# Patient Record
Sex: FEMALE | Race: WHITE | NOT HISPANIC OR LATINO | Employment: OTHER | URBAN - METROPOLITAN AREA
[De-identification: names, ages, dates, MRNs, and addresses within clinical notes are randomized per-mention and may not be internally consistent; named-entity substitution may affect disease eponyms.]

---

## 2022-05-12 ENCOUNTER — APPOINTMENT (OUTPATIENT)
Dept: CT IMAGING | Facility: HOSPITAL | Age: 82
End: 2022-05-12
Attending: EMERGENCY MEDICINE
Payer: COMMERCIAL

## 2022-05-12 ENCOUNTER — HOSPITAL ENCOUNTER (EMERGENCY)
Facility: HOSPITAL | Age: 82
Discharge: HOME | End: 2022-05-12
Attending: EMERGENCY MEDICINE | Admitting: EMERGENCY MEDICINE
Payer: COMMERCIAL

## 2022-05-12 VITALS
DIASTOLIC BLOOD PRESSURE: 81 MMHG | RESPIRATION RATE: 15 BRPM | HEART RATE: 78 BPM | SYSTOLIC BLOOD PRESSURE: 185 MMHG | WEIGHT: 187.39 LBS | OXYGEN SATURATION: 99 % | TEMPERATURE: 98.5 F

## 2022-05-12 DIAGNOSIS — S09.90XA CLOSED HEAD INJURY, INITIAL ENCOUNTER: ICD-10-CM

## 2022-05-12 PROBLEM — D12.6 BENIGN NEOPLASM OF COLON: Status: ACTIVE | Noted: 2022-05-12

## 2022-05-12 PROBLEM — M85.859 OSTEOPENIA OF THIGH: Status: ACTIVE | Noted: 2017-12-05

## 2022-05-12 PROBLEM — I73.9 PVD (PERIPHERAL VASCULAR DISEASE) (H): Status: ACTIVE | Noted: 2021-01-13

## 2022-05-12 PROBLEM — S85.001A INJURY OF RIGHT POPLITEAL ARTERY: Status: ACTIVE | Noted: 2020-10-21

## 2022-05-12 PROBLEM — J98.4 DISEASE OF LUNG: Status: ACTIVE | Noted: 2022-05-12

## 2022-05-12 PROBLEM — M25.819 OTHER AFFECTIONS OF SHOULDER REGION, NOT ELSEWHERE CLASSIFIED: Status: ACTIVE | Noted: 2022-05-12

## 2022-05-12 PROBLEM — K57.30 DIVERTICULOSIS OF LARGE INTESTINE: Status: ACTIVE | Noted: 2022-05-12

## 2022-05-12 PROBLEM — J44.9 CHRONIC OBSTRUCTIVE PULMONARY DISEASE (H): Status: ACTIVE | Noted: 2019-01-09

## 2022-05-12 PROBLEM — J45.909 ASTHMA: Status: ACTIVE | Noted: 2022-05-12

## 2022-05-12 PROBLEM — K52.832 LYMPHOCYTIC COLITIS: Status: ACTIVE | Noted: 2022-01-14

## 2022-05-12 PROBLEM — M72.2 PLANTAR FASCIAL FIBROMATOSIS: Status: ACTIVE | Noted: 2022-05-12

## 2022-05-12 PROBLEM — C44.40 UNSPECIFIED MALIGNANT NEOPLASM OF SKIN OF SCALP AND NECK: Status: ACTIVE | Noted: 2019-01-10

## 2022-05-12 PROCEDURE — 70450 CT HEAD/BRAIN W/O DYE: CPT

## 2022-05-12 PROCEDURE — 99284 EMERGENCY DEPT VISIT MOD MDM: CPT | Mod: 25

## 2022-05-12 ASSESSMENT — ENCOUNTER SYMPTOMS
HEADACHES: 1
ARTHRALGIAS: 1

## 2022-05-13 NOTE — ED TRIAGE NOTES
She feel two days ago striking her head on a book case. She tripped on a rug. She denies LOC. She came in today because because she has a headache on the left side. She is on blood thinners.

## 2022-05-13 NOTE — ED PROVIDER NOTES
EMERGENCY DEPARTMENT ENCOUNTER      NAME: Aster Pierson  AGE: 81 year old female  YOB: 1940  MRN: [id number]  EVALUATION DATE & TIME: 5/12/2022  8:42 PM    PCP: No primary care provider on file.    ED PROVIDER: Quinton Mcclure M.D.      Chief Complaint   Patient presents with     Fall         IMPRESSION  1. Closed head injury, initial encounter        PLAN  - close PCP follow up  - discharge to home    ED COURSE & MEDICAL DECISION MAKING    ED Course as of 05/12/22 2237   Thu May 12, 2022   2058 81yoF with history of COPD, PVD (takes Plavix but no other blood thinners), HTN presenting for evaluation of headache after head injury. Reports she tripped on a rug and fell at home 2 nights ago; hit left side of head without LOC. Ongoing headache since then; came to the ED concerned about head bleed (states her  had SDH in the past). Denies any vision changes, numbness, tingling, focal weakness, neck pain. No other pains after fall.    BP 190s/80s on presentation with otherwise normal vitals. Exam with 0.5cm diameter mildly-tender hematoma to left parietal scalp with no c-spine tenderness with painless active ROM intact, other trauma exam with nontender ecchymosis to left forearm & right knee with otherwise atraumatic exam. Otherwise has completely normal neuro exam. Will obtain CT head to rule out ICH; low clinical concern for this though. Patient comfortable with this plan; no further questions at this time.     CT head unremarkable with no ICH or other acute abnormality. Patient feeling well on recheck with SBP 150s; continues to have normal neuro exam. Ok for outpatient management. Patient able to tolerate PO and walk without difficulty. Patient comfortable with discharge at this time. Return precautions and need for PCP follow up discussed and understood. No further questions at the time of discharge.    --------------------------------------------------------------------------------    --------------------------------------------------------------------------------     8:46 PM I met with the patient for the initial interview and physical examination. Discussed plan for treatment and workup in the ED.  9:54 PM We discussed the plan for discharge and the patient is agreeable. Reviewed supportive cares, symptomatic treatment, outpatient follow up, and reasons to return to the Emergency Department. All questions and concerns were addressed. Patient to be discharged by ED RN.      This patient involved a high degree of complexity in medical decision making, as significant risks were present and assessed.    Broad differential considered for this patient presenting, including but not limited to:  ICH, skull fracture, scalp contusion, other traumatic injury    I wore the following PPE during this patient encounter:  N95 mask, face shield w/ eye protection, gloves, gown    MEDICATIONS GIVEN IN THE EMERGENCY DEPARTMENT  Medications - No data to display    NEW PRESCRIPTIONS STARTED AT TODAY'S ER VISIT  There are no discharge medications for this patient.          =================================================================      HPI  Patient information was obtained from: patient     Use of : N/A         Aster Pierson is a 81 year old female with a pertinent history of hypertension, COPD, asthma, and PVD who presents to this ED via walk in for evaluation of fall.     On Tuesday 5/10/22, the patient reports having tripping over a rug. She notes that she hit the left side of her head on a bookcase and then proceeded to fall to the ground on her right knee. The patient is now having associated left sided headache and right knee pain. The patient denies loss of consciousness or any other complaints at this time.    The patient is taking the blood thinner Plavix.     REVIEW OF SYSTEMS   Review of Systems   Constitutional:        Positive for fall   Musculoskeletal: Positive for arthralgias  (right knee pain).   Neurological: Positive for headaches (left sided).        Negative for loss of consciousness    All other systems reviewed and are negative.    All other systems reviewed and are negative except as noted above in HPI.          --------------- MEDICAL HISTORY ---------------  PAST MEDICAL HISTORY:  No past medical history on file.  Patient Active Problem List   Diagnosis     Unspecified malignant neoplasm of skin of scalp and neck     Plantar fascial fibromatosis     Other affections of shoulder region, not elsewhere classified     Osteopenia of thigh     VALERIE (obstructive sleep apnea)     Malignant melanoma (H)     Lymphocytic colitis     Interstitial cystitis     Injury of right popliteal artery     PVD (peripheral vascular disease) (H)     Diverticulosis of large intestine     Disease of lung     Chronic obstructive pulmonary disease (H)     Asthma     Benign neoplasm of colon     Abnormal finding on cardiovascular stress test       PAST SURGICAL HISTORY:  Reviewed. No pertinent past surgical history.    CURRENT MEDICATIONS:    No current facility-administered medications for this encounter.  No current outpatient medications on file.    ALLERGIES:  No Known Allergies    FAMILY HISTORY:  Reviewed. No pertinent past family history.    SOCIAL HISTORY:   Social History     Socioeconomic History     Marital status:          --------------- PHYSICAL EXAM ---------------  Nursing notes and vitals reviewed by me.  VITALS:  Vitals:    05/12/22 1955 05/12/22 2047   BP: (!) 190/89 (!) 185/81   Pulse: 79 78   Resp: 12 15   Temp: 98.5  F (36.9  C)    TempSrc: Temporal    SpO2: 95% 99%   Weight: 85 kg (187 lb 6.3 oz)        PHYSICAL EXAM:    General:  alert, interactive, no distress  Eyes:  conjunctivae clear, conjugate gaze  HENT:  nose with no rhinorrhea, oropharynx clear. 0.5cm diameter left parietal scalp hematoma. Overlying skin intact.   Neck:  no meningismus. No c-spine tenderness. Painless  ROM  Cardiovascular:  HR 100s during exam, regular rhythm, no murmurs, brisk cap refill  Chest:  no chest wall tenderness  Pulmonary:  no stridor, normal phonation, normal work of breathing, clear lungs bilaterally  Abdomen:  soft, nondistended, nontender  :  no CVA tenderness  Back:  no midline spinal tenderness  Musculoskeletal:  no pretibial edema, no calf tenderness. Gross ROM intact to joints of extremities with no obvious deformities.  Skin:  warm, dry, no rash  Neuro:  awake, alert, answers questions appropriately, follows commands, moves all limbs, CN 2-12 intact, negative pronator drift, 5/5 strength to all extremities with sensation to light touch intact  Psych:  calm, normal affect      --------------- RESULTS ---------------  RADIOLOGY:  Reviewed by me. Please see official radiology report.  Recent Results (from the past 24 hour(s))   CT Head w/o Contrast    Narrative    EXAM: CT HEAD W/O CONTRAST  LOCATION: [facility]  DATE/TIME: 5/12/2022 9:10 PM    INDICATION: Fall, head injury  COMPARISON: None.  TECHNIQUE: Routine CT Head without IV contrast. Multiplanar reformats. Dose reduction techniques were used.    FINDINGS:  INTRACRANIAL CONTENTS: No intracranial hemorrhage, extraaxial collection, or mass effect.  No CT evidence of acute infarct. Mild presumed chronic small vessel ischemic changes. Mild generalized volume loss. No hydrocephalus.     VISUALIZED ORBITS/SINUSES/MASTOIDS: Prior bilateral cataract surgery. Visualized portions of the orbits are otherwise unremarkable. No paranasal sinus mucosal disease. No middle ear or mastoid effusion.    BONES/SOFT TISSUES: No acute abnormality.      Impression    IMPRESSION:  1.  No acute intracranial abnormality.             IMichelle, am serving as a scribe to document services personally performed by Dr. Quinton Mcclure based on my observation and the provider's statements to me. Quinton DODD MD attest that Michelle Veragra  is acting in a scribe capacity, has observed my performance of the services and has documented them in accordance with my direction.      Quinton Mcclure MD  05/12/22  Emergency Medicine  [facility] EMERGENCY DEPARTMENT  [address] [place] [id number]  [phone]  Dept: [phone]     Quinton Mcclure MD  05/12/22 7543

## 2022-05-13 NOTE — DISCHARGE INSTRUCTIONS
As needed for pain control at home, take:  - over-the-counter ibuprofen 800mg by mouth every 8 hours (max dose 2400mg in 24 hours)  - over-the-counter acetaminophen 1g by mouth every 6 hours (max dose 4g in 24 hours)  - ice pack to painful area up to 20 minutes every 1 hour    Follow up with your Primary Care provider in 2 days for a recheck.    Return to the Emergency Department for any persistent vomiting, inability to walk, or any other concerns.

## 2024-08-30 ENCOUNTER — TRANSFERRED RECORDS (OUTPATIENT)
Dept: HEALTH INFORMATION MANAGEMENT | Facility: CLINIC | Age: 84
End: 2024-08-30
Payer: COMMERCIAL

## 2024-08-30 ENCOUNTER — TELEPHONE (OUTPATIENT)
Dept: VASCULAR SURGERY | Facility: CLINIC | Age: 84
End: 2024-08-30
Payer: COMMERCIAL

## 2024-08-30 NOTE — TELEPHONE ENCOUNTER
Faxed records from [facility] received at 6:47am on 8/30/24 to the vascular fax.  No referral or any indication of why these were sent to us.  Forwarded fax to HIM.

## 2025-04-05 NOTE — H&P (VIEW-ONLY)
[facility]      Preoperative Consultation   Aster Pierson   : 1940   Gender: female    Date of Encounter: 2025    Nursing Notes:   Kizzy Marin CNA  2025  1:42 PM  Signed  Chief Complaint   Patient presents with     Preoperative Exam     SURGERY :    KNEE REPLACEMENT   SURGEON : DR CAMILO ADAN   FAX :[phone]          Additional visit information (chief complaint/health maintenance) shared by patient: none  Health Maintenance Due   Topic Date Due     COVID-19 vaccine series ( season) 2024     Health maintenance reviewed with patient Yes      Patient presents for an in-person office visit: alone  Communication Method: Patient is active on [employer] and has been instructed that results/communications will be made via [employer]  If a phone call is needed, the preferred number is: Mobile   Home Phone [phone]   Work Phone [phone]   Mobile [phone]     May we leave a detailed message at this number? Yes    Aster Pierson is a 84 y.o. female (1940) who presents for preop consultation at the request of DR. CAMILO COELLO     Date of Surgery: 2025  Surgical Specialty: Orthopedic  Cmailo Coello MD - [facility]/Surgical Facility: [facility]  Fax number: [phone]  Surgery type: inpatient  Primary Physician: Yady Zambrano CNA ....................  2025   1:11 PM          Lakisha Arnett LPN  2025  2:25 PM  Signed  Student charting has been reviewed and approved for this patient s record.  Lakisha Arnett LPN 2025 2:25 PM         History of Present Illness   Patient is here to do her preop physical for a left knee replacement.  Had the right one done 5 yrs ago.  No other issues.           Review of Systems   A comprehensive review of systems was negative except for items noted in HPI.    Patient Active Problem List   Diagnosis Code      ASTHMA  J45.909     Basal cell carcinoma of right neck C44.40     HTN (hypertension) I10     Malignant melanoma (HC) C43.9     Interstitial cystitis N30.10     VALERIE (obstructive sleep apnea) G47.33     Mild Osteopenia on DEXA Dec 2017 M85.859     Chronic obstructive pulmonary disease (HC) J44.9     PVD (peripheral vascular disease) I73.9     Lymphocytic colitis K52.832     Benign essential tremor G25.0     Depression F32.A     Hyperlipidemia E78.5     Traumatic ulcer of left lower leg with fat layer exposed (HC) L97.922     Injury of right popliteal artery S85.001A     Benign neoplasm of colon D12.6     Bilateral lower extremity edema R60.0     BMI 29.0-29.9,adult Z68.29     Current Outpatient Medications   Medication Sig     albuterol HFA (PRO-AIR; VENTOLIN; PROVENTIL) 90 mcg/actuation inhaler INHALE 1-2 PUFFS BY MOUTH EVERY 4 HOURS IF NEEDED.     artificial tears, peg 400-propylene glycol, (Systane Ultra) 0.4-0.3 % drop ophthalmic Place 1 Drop into both eyes two times daily.     atorvastatin 10 mg tablet TAKE 1 TABLET BY MOUTH EVERYDAY AT BEDTIME     chlorthalidone (HYGROTON) 50 mg tablet Take 1 Tablet (50 mg) by mouth once daily.     cholecalciferol, Vitamin D3, 2,000 unit tablet Take 1 Tablet (2,000 units) by mouth once daily.     clopidogreL (PLAVIX) 75 mg tablet Take 1 Tablet by mouth once daily.     CPAP CPAP () machine for home use at pressure: 5-10, , Choice of mask ( or ) w/full face cushion () x1/mo, nasal cushion () x2/mo, or nasal pillows () x 2/mo; Length of Need: 99 months; Frequency of use: Daily     Cranberry 500 mg cap Take 1 Capsule (500 mg) by mouth two times daily.     FLUoxetine 20 mg capsule TAKE 1 CAPSULE BY MOUTH EVERY DAY IN THE MORNING     fluticasone propion-salmeterol 232-14 mcg/actuation inhaler Inhale 1 Puff by mouth two times daily.     ipratropium (ATROVENT NASAL) 42 mcg (0.06 %) nasal spray Inhale 2 sprays in each nostril 2 times daily. (Patient taking  differently: only using daily)     Lactobacillus acidophilus (Probiotic) 10 billion cell cap Take 1 Capsule by mouth once daily.     losartan 50 mg tablet Take 1 Tablet (50 mg) by mouth two times daily.     medication order composer Fischer concentration: Blueberry 645mg, Tart cherry 180mg take 1 capsule daily.     medication order composer Coenzyme Q10, turmeric curcumin 1000mg, black pepper 10mg: take 1 cap by mouth daily     montelukast 10 mg tablet Take 1 Tablet (10 mg) by mouth at bedtime.     potassium chloride (K-TAB) 20 mEq extended-release tablet TAKE 1 TABLET (20 MEQ) BY MOUTH ONCE DAILY WITH A MEAL.     spironolactone 25 mg tablet Take 1 Tablet (25 mg) by mouth once daily in the morning.     No current facility-administered medications for this visit.     Medications have been reviewed by me and are current to the best of my knowledge and ability.     No Known Allergies  Past Surgical History:   . Laterality Date     COLONOSCOPY DIAGNOSTIC  3/18/2011    Repeat in 5 years     JOINT REPLACEMENT Right 2020    Right total knee replacement      OTHER  .t    removal of basal cell carcinoma of right neck     SOCORRO AND BSO      due to fibroids and menorrhagia     Social History     Tobacco Use     Smoking status: Former     Current packs/day: 0.00     Average packs/day: 0.5 packs/day for 17.0 years (8.5 ttl pk-yrs)     Types: Cigarettes     Start date: 1957     Quit date: 1974     Years since quittin.2     Smokeless tobacco: Never     Tobacco comments:     quit in    Vaping Use     Vaping status: Never Used   Substance Use Topics     Alcohol use: Yes     Alcohol/week: 2.0 - 3.0 standard drinks of alcohol     Types: 2 - 3 Glasses of wine per week     Comment: only social, occ.     Drug use: No     Family History   Problem Relation Age of Onset     Cancer-colon Mother      Other Mother         Alzheimers     Heart Disease Mother         tachycardia     Other Father         TIA, Parkinsons,  "Depression     Psychiatric illness Father      Diabetes Father      Other Sister      Cancer-breast Sister 37        passed away at 43     Good Health Brother      Other Brother         BPH     Other Maternal Grandmother      Stroke Maternal Grandmother      Heart Disease Paternal Grandmother         MI     Other Paternal Grandfather         Cirrhosis due to alcohol     Alcohol/Drug Paternal Grandfather      Other Daughter         depression     Cancer-breast Daughter 45     Good Health Daughter      Cancer-breast Other         maternal cousins     Cancer-ovarian No Family History      Results              PAST DIFFICULTY WITH ANESTHESIA: None     Physical Exam   /50 (Cuff Site: Right Arm, Position: Sitting, Cuff Size: Adult Regular)   Pulse 69   Ht 1.634 m (5' 4.33\")   Wt 78.7 kg (173 lb 6.4 oz)   BMI 29.46 kg/m   Body mass index is 29.46 kg/m .  Physical Exam            General Appearance: Pleasant, alert, appropriate appearance for age. No acute distress  Head Exam: Normal. Normocephalic, atraumatic.  Neck Exam: Supple, no masses or nodes.  Thyroid Exam: No nodules or enlargement.  Chest/Respiratory Exam: Normal chest wall and respirations. Clear to auscultation.  Cardiovascular Exam: Regular rate and rhythm. S1, S2, no murmur, click, gallop, or rubs.  Musculoskeletal Exam: Back is straight and non-tender, full ROM of upper and lower extremities.  Skin: no rash or abnormalities  Neurologic Exam: Nonfocal; symmetric DTRs, normal gross motor movement, tone, and coordination. No tremor.  Psychiatric Exam: Alert and oriented, appropriate affect.     Assessment / Plan     Labs: yes  ECG: yes, EKG overall stable from 2022, had a neg stress test done then      ICD-10-CM    1. Preoperative cardiovascular examination  Z01.810 BASIC METABOLIC PANEL     HEMOGLOBIN     VA READING EKG - NO CHARGE, COMP ONLY     EKG 12 LEAD     VA ECG ROUTINE ECG W/LEAST 12 LDS W/I&R      2. Primary osteoarthritis of left knee  " M17.12       3. HTN (hypertension) - stable, continue medications, will check potassium level and see if nL, then doesn't need supp I10 BASIC METABOLIC PANEL     spironolactone 25 mg tablet     losartan 50 mg tablet      4. Osteopenia of thigh, unspecified laterality  M85.859       5. VALERIE (obstructive sleep apnea) - stable G47.33       6. Moderate persistent asthma, unspecified whether complicated (HC) - stable J45.40 montelukast 10 mg tablet     fluticasone propion-salmeterol 232-14 mcg/actuation inhaler      7. Chronic obstructive pulmonary disease, unspecified COPD type (HC) - stable J44.9       8. Need for COVID-19 vaccine - will get later Z23       9. Depression, unspecified depression type  F32.A FLUoxetine 20 mg capsule      10. Hyperlipidemia, unspecified hyperlipidemia type  E78.5 atorvastatin 10 mg tablet          Patient is cleared for planned procedure.   Electronically Signed by:   Yady Zambrano MD   4/7/2025      The Pre-Op Tool    Recommendations      Intermediate Risk Procedure    Risk of CV Complication (RCRI)  0.5%    Current Cardiac Status  Good exertional capacity ( > 4 mets )    Cardiac History  No history of coronary artery disease    Sleep Apnea  History of obstructive sleep apnea, on CPAP           Labs  HGB within last 30 days  Potassium within last 30 days  EKG  Baseline EKG within the last 12 months  CXR  Not indicated    Stress Testing  Not indicated    * Testing recommendations are intended to assist, but not direct, clinical decisions.           Type & Screen should be obtained by Anesthesia only if the risk of transfusion is > 5% for the procedure         Hold Losartan (Cozaar) the evening before and/or morning of the procedure.  Hold Chlorthalidone, Spironolactone (Aldactone) on the morning of procedure.  Take your other medications as usual prior to the procedure  Hold vitamins and/or supplements for 1 week prior to the procedure  Hold fish oil for 2 weeks prior to the procedure  Okay  to take Acetaminophen (Tylenol) up until the procedure  Hold / avoid NSAIDs (e.g. ibuprofen, naproxen) prior to procedure: 2 days for ibuprofen (Advil) and 4 days for naproxen (Aleve).    * Medication recommendations are not intended to be exhaustive; they are limited to common medications that are potentially dangerous if incorrectly managed          Labs  * Data supports elimination of  routine  laboratory testing in favor of focused,  indicated  testing based on medical co-morbidities. A 2009 study randomized 1061 patients undergoing ambulatory, non-cataract surgery to routine or to indicated testing. Perioperative adverse events were similar (Anesthesia & Analgesia 2009;108:467-75; Anesthesiol. Clin. 2016 Mar;34(1):43-58).  Stress Testing  * The current ACC/AHA guideline states that 'non-invasive stress testing is not useful for patients [with no clinical risk factors] undergoing noncardiac surgery' (JACC. 2014;64(21);e1-76.).  RAAS Antagonist  * Hypotension during anesthesia is associated with continuing renin-angiotensin system (RAAS) antagonist. While it is unclear if holding RAAS antagonists reduces postoperative complications, in most circumstances our experts recommend holding RAAS antagonist 24 hours prior to surgery. (Postgrad Med J 2011;87:472-81; Anest 2017;126:16-27; BMC Anesthesiol 2018;18:26.)     Session ID: [id number]  Endnotes and bibliography available upon request: [email or web address]

## 2025-04-14 RX ORDER — FLUTICASONE FUROATE AND VILANTEROL 200; 25 UG/1; UG/1
1 POWDER RESPIRATORY (INHALATION) DAILY
COMMUNITY

## 2025-04-14 RX ORDER — ADHESIVE TAPE 3"X 2.3 YD
1 TAPE, NON-MEDICATED TOPICAL 2 TIMES DAILY
COMMUNITY

## 2025-04-14 RX ORDER — LACTOBACILLUS RHAMNOSUS GG 10B CELL
1 CAPSULE ORAL DAILY
COMMUNITY

## 2025-04-14 RX ORDER — FLUOXETINE 20 MG/1
20 TABLET, FILM COATED ORAL DAILY
COMMUNITY

## 2025-04-14 RX ORDER — CLOPIDOGREL BISULFATE 75 MG/1
75 TABLET ORAL DAILY
COMMUNITY

## 2025-04-14 RX ORDER — IPRATROPIUM BROMIDE 42 UG/1
2 SPRAY, METERED NASAL DAILY
COMMUNITY

## 2025-04-14 RX ORDER — CHOLECALCIFEROL (VITAMIN D3) 50 MCG
1 TABLET ORAL DAILY
COMMUNITY

## 2025-04-14 RX ORDER — POTASSIUM CHLORIDE 1500 MG/1
20 TABLET, EXTENDED RELEASE ORAL DAILY
COMMUNITY

## 2025-04-14 RX ORDER — SPIRONOLACTONE 25 MG/1
25 TABLET ORAL DAILY
COMMUNITY

## 2025-04-14 RX ORDER — ATORVASTATIN CALCIUM 10 MG/1
10 TABLET, FILM COATED ORAL AT BEDTIME
COMMUNITY

## 2025-04-14 RX ORDER — CHLORTHALIDONE 50 MG/1
50 TABLET ORAL DAILY
COMMUNITY

## 2025-04-14 RX ORDER — LOSARTAN POTASSIUM 50 MG/1
50 TABLET ORAL 2 TIMES DAILY
COMMUNITY

## 2025-04-14 RX ORDER — ALBUTEROL SULFATE 90 UG/1
1-2 INHALANT RESPIRATORY (INHALATION) EVERY 4 HOURS PRN
COMMUNITY

## 2025-04-14 RX ORDER — MONTELUKAST SODIUM 10 MG/1
10 TABLET ORAL AT BEDTIME
COMMUNITY

## 2025-04-15 RX ORDER — FLUTICASONE PROPIONATE AND SALMETEROL 232; 14 UG/1; UG/1
1 POWDER, METERED RESPIRATORY (INHALATION) 2 TIMES DAILY
Status: ON HOLD | COMMUNITY
End: 2025-04-23

## 2025-04-15 RX ORDER — ACETAMINOPHEN 500 MG
1000 TABLET ORAL EVERY 6 HOURS PRN
Status: ON HOLD | COMMUNITY
End: 2025-04-24

## 2025-04-15 RX ORDER — THERA TABS 400 MCG
1 TAB ORAL DAILY
COMMUNITY

## 2025-04-15 NOTE — PROGRESS NOTES
Planning to discharge home on POD 1 in the morning with her friend staying with her and helping her after surgery.       04/15/25 5335   Discharge Planning   Patient/Family Anticipates Transition to home;home with help/services  (Pt. can get home care PT at her apartment through [facility])   Concerns to be Addressed all concerns addressed in this encounter   Living Arrangements   People in Home alone   Type of Residence Private Residence;Independent Senior Housing   Is your private residence a single family home or apartment? Apartment   Number of Stairs, Within Home, Primary none   Once home, are you able to live on one level? Yes   Which level? Main Level   Bathroom Shower/Tub Walk-in shower   Equipment Currently Used at Home grab bar, tub/shower;raised toilet seat;shower chair;dressing device  (Has a walker at home to use.)   Support System   Support Systems Friends/Neighbors  (friend, Anum Mosley, will come stay with her for at least 2 days after surgery)   Do you have someone available to stay with you one or two nights once you are home? Yes   Education   Patient attended total joint pre-op class/received pre-op teaching  email/phone call

## 2025-04-17 NOTE — PROVIDER NOTIFICATION
Scheduled for left total knee arthroplasty 4/23/25 at [facility] with Dr. Graff.    Informed Dr. Graff's team of Hgb of 11.7 before surgery. They will proceed with surgery with Hgb of 11.7.    Annabelle Alaniz RN

## 2025-04-21 NOTE — TREATMENT PLAN
Orthopedic Surgery Pre-Op Plan: Aster Pierson  pre-op review. This is NOT an H&P   Surgeon: Dr. Graff    [facility]: [place]  Name of Surgery: Left Total Knee Arthroplasty   Date of Surgery: 4/23/25  H&P: Completed on 4/7/25 by Dr. Yady Zambrano at [facility].   History of ASA, NSAIDS, vitamin and/or herbal supplements, GLP-1 Agonist or SGLT Inhibitor medication taken within 10 days?: Yes: Tart Cherry, Cranberry caps, Multivitamins: Patient was instructed to hold these vitamins and supplements for 7 days before surgery.  History of blood thinners?: Yes: on clopidogrel (Plavix) for Peripheral Vascular Disease S/P Right Popliteal Artery Stent in 2020.  Patient was instructed to temporarily hold Plavix for 7 days before surgery (take last dose on 4/15/2025, then hold).    Plan:   1) Discharge Plan: Home on POD 1 in the morning with her friend staying with her and helping her after surgery. Please see Discharge Planning section near bottom of this note for further details.     2) Anemia: Mild: Hemoglobin 11.7 at preop exam on 4/7/2025.  Shows very mild anemia.  Dr. Graff notified and is OK proceeding.  Recommend routine monitoring of postop hemoglobin.    3) Peripheral Vascular Disease: S/P Right Popliteal Artery Stent in 2020: Reviewed most recent Vascular Surgery visit note with Dr. Jerome Thompson, [facility] Vascular Surgery, from 4/16/25: Patient is s/p popliteal artery stent in 2020 because of injury to popliteal artery during right total knee replacement surgery. Patient continues to do well. Denies any claudication symptoms. Most recent ultrasound shows stent to be widely patent. On Plavix.  Patient was instructed to hold Plavix for 7 days before surgery (take last dose on 4/15/2025, then hold).  After surgery, we will plan to resume Plavix as soon as safe from a bleeding standpoint per Dr. Velázquez.  Plan is to follow up with Vascular Surgery again in 1 year with repeat  ultrasound at that time.     4) Hyperlipidemia: On atorvastatin.    5) Hypertension: Appears well-controlled on losartan, chlorthalidone and spironolactone.  Patient was instructed to hold losartan, chlorthalidone, and spironolactone on the morning of surgery.     6) Asthma: Uncomplicated: Stable.  On Advair, fluticasone-salmeterol, and albuterol inhalers.    7) COPD: Stable.  Denies any recent exacerbations.  On inhalers as noted in #5 above.     8) Obstructive Sleep Apnea: Uses CPAP.  Patient reminded to bring CPAP machine to the hospital and to use it whenever sleeping or napping.    9) Benign Essential Tremor: reports head tremor when she is stressed.     Patient appears medically optimized for upcoming surgery. I would recommend Hospitalist Consult to assist with medical management. Please call me below with any questions on this patient.       Review of Systems Notable for: Anemia-mild, peripheral vascular disease-s/p right popliteal artery stent in 2020-on Plavix, hyperlipidemia, hypertension, asthma-uncomplicated, COPD, obstructive sleep apnea-uses CPAP, benign essential tremor.    Past Medical History:   Past Medical History:   Diagnosis Date    Antiplatelet or antithrombotic long-term use     Arthritis     Benign essential tremor     head shakes when stressed    COPD (chronic obstructive pulmonary disease) (H)     Depression     Gastroesophageal reflux disease     History of melanoma     History of skin cancer     Hyperlipidemia     Hypertension     Interstitial cystitis     Lower extremity edema     Osteopenia     PVD (peripheral vascular disease)     Recurrent UTI     Sleep apnea     CPAP    Uncomplicated asthma     with activity     Past Surgical History:   Procedure Laterality Date    COLONOSCOPY  2021    HYSTERECTOMY TOTAL ABDOMINAL, BILATERAL SALPINGO-OOPHORECTOMY, COMBINED  1991    IR LOWER EXTREMITY ANGIOGRAM RIGHT  06/16/2023    IR LOWER EXTREMITY ANGIOGRAM RIGHT  10/26/2020    IR LOWER EXTREMITY  ANGIOGRAM RIGHT  06/10/2020    POPLITEAL ARTERY STENT Right 01/2021    SKIN CANCER EXCISION Right     right neck and shoulders    TOTAL KNEE ARTHROPLASTY Right 06/03/2020       Current Medications:  Patient's Medications   New Prescriptions    No medications on file   Previous Medications    ACETAMINOPHEN (TYLENOL) 500 MG TABLET    Take 1,000 mg by mouth every 6 hours as needed for mild pain.    ALBUTEROL (PROAIR HFA/PROVENTIL HFA/VENTOLIN HFA) 108 (90 BASE) MCG/ACT INHALER    Inhale 1-2 puffs into the lungs every 4 hours as needed for shortness of breath, wheezing or cough.    ATORVASTATIN (LIPITOR) 10 MG TABLET    Take 10 mg by mouth daily.    BIOTIN MAXIMUM PO    Take 6,000 mg by mouth daily. Vit. A, C, and E  Stopped 4/16/25 before surgery    CHLORTHALIDONE (HYGROTON) 50 MG TABLET    Take 50 mg by mouth daily.    CLOPIDOGREL (PLAVIX) 75 MG TABLET    Take 75 mg by mouth daily. Last dose 4/15/25 before surgery    COENZYME Q10 (COQ10 PO)    Take 1 capsule by mouth daily. With turmeric and black pepper  Stopped 4/16/25 before surgery    CRANBERRY 500 MG CAPS    Take 1 capsule by mouth 2 times daily.    FIBER ADULT GUMMIES PO    Take 2 Gum by mouth daily.    FLUOXETINE 20 MG TABLET    Take 20 mg by mouth daily.    FLUTICASONE-SALMETEROL (AIRDUO RESPICLICK) 232-14 MCG/ACT INHALER    Inhale 1 puff into the lungs 2 times daily.    FLUTICASONE-VILANTEROL (BREO ELLIPTA) 200-25 MCG/ACT INHALER    Inhale 1 puff into the lungs daily.    IPRATROPIUM (ATROVENT) 0.06 % NASAL SPRAY    Spray 2 sprays into both nostrils daily.    LACTOBACILLUS RHAMNOSUS, GG, (CULTURELL) CAPSULE    Take 1 capsule by mouth daily.    LOSARTAN (COZAAR) 50 MG TABLET    Take 50 mg by mouth 2 times daily.    MAGNESIUM CITRATE (MAGNESIUM GUMMIES PO)    Take 330 mg by mouth daily. Calm    MONTELUKAST (SINGULAIR) 10 MG TABLET    Take 10 mg by mouth at bedtime.    MULTIVITAMIN, THERAPEUTIC (THERA-VIT) TABS TABLET    Take 1 tablet by mouth daily. Stopped  4/16/25 before surgery    POLYETHYLENE GLYCOL-PROPYLENE GLYCOL (SYSTANE ULTRA) 0.4-0.3 % SOLN OPHTHALMIC SOLUTION    Place 1 drop into both eyes 2 times daily.    POTASSIUM CHLORIDE ER (K-TAB) 20 MEQ CR TABLET    Take 20 mEq by mouth daily.    SPIRONOLACTONE (ALDACTONE) 25 MG TABLET    Take 25 mg by mouth daily.    TART CHERRY PO    Take 1 capsule by mouth daily. Blueberry 645mg and tart cherry 180mg    VITAMIN D3 (CHOLECALCIFEROL) 50 MCG (2000 UNITS) TABLET    Take 1 tablet by mouth daily.   Modified Medications    No medications on file   Discontinued Medications    No medications on file       ALLERGIES:  No Known Allergies    Social History  Social History     Tobacco Use    Smoking status: Former     Types: Cigarettes    Smokeless tobacco: Never    Tobacco comments:     Quit smoking 1974   Substance Use Topics    Alcohol use: Yes     Comment: 2 drinks per month    Drug use: Never       Any Abnormal Recent Diagnostics? Yes  Hemoglobin 11.7 on 4/27/2025: Shows very mild anemia.  Dr. Graff notified and is OK proceeding.   Blood glucose 122 on 4/7/2025: No history of prediabetes or diabetes.  Last hemoglobin A1c 5.5 on 1/27/2025.     Discharge Planning:   Planning to discharge home on POD 1 in the morning with her friend staying with her and helping her after surgery.      04/15/25 2950   Discharge Planning   Patient/Family Anticipates Transition to home;home with help/services  (Pt. can get home care PT at her apartment through [facility])   Concerns to be Addressed all concerns addressed in this encounter   Living Arrangements   People in Home alone   Type of Residence Private Residence;Independent Senior Housing   Is your private residence a single family home or apartment? Apartment   Number of Stairs, Within Home, Primary none   Once home, are you able to live on one level? Yes   Which level? Main Level   Bathroom Shower/Tub Walk-in shower   Equipment Currently Used at Home grab bar,  tub/shower;raised toilet seat;shower chair;dressing device  (Has a walker at home to use.)   Support System   Support Systems Friends/Neighbors  (friend, Anum Mosley, will come stay with her for at least 2 days after surgery)   Do you have someone available to stay with you one or two nights once you are home? Yes   Education   Patient attended total joint pre-op class/received pre-op teaching  email/phone call       VAN Morillo, CNP   Advanced Practice Nurse Navigator- Orthopedics  [facility]   Phone: [phone]    Render Post-Care Instructions In Note?: yes Consent: The patient's consent was obtained including but not limited to risks of crusting, scabbing, blistering, scarring, darker or lighter pigmentary change, recurrence, incomplete removal and infection. Detail Level: Detailed Post-Care Instructions: I reviewed with the patient in detail post-care instructions. Patient is to wear sunprotection, and avoid picking at any of the treated lesions. Pt may apply Aquaphor to crusted or scabbing areas. Duration Of Freeze Thaw-Cycle (Seconds): 5

## 2025-04-22 ENCOUNTER — ANESTHESIA EVENT (OUTPATIENT)
Dept: SURGERY | Facility: CLINIC | Age: 85
End: 2025-04-22
Payer: COMMERCIAL

## 2025-04-23 ENCOUNTER — APPOINTMENT (OUTPATIENT)
Dept: RADIOLOGY | Facility: CLINIC | Age: 85
End: 2025-04-23
Attending: ORTHOPAEDIC SURGERY
Payer: COMMERCIAL

## 2025-04-23 ENCOUNTER — HOSPITAL ENCOUNTER (OUTPATIENT)
Facility: CLINIC | Age: 85
Discharge: HOME | End: 2025-04-24
Attending: ORTHOPAEDIC SURGERY | Admitting: ORTHOPAEDIC SURGERY
Payer: COMMERCIAL

## 2025-04-23 ENCOUNTER — ANESTHESIA (OUTPATIENT)
Dept: SURGERY | Facility: CLINIC | Age: 85
End: 2025-04-23
Payer: COMMERCIAL

## 2025-04-23 DIAGNOSIS — Z96.652 HISTORY OF TOTAL KNEE ARTHROPLASTY, LEFT: Primary | ICD-10-CM

## 2025-04-23 PROCEDURE — C1776 JOINT DEVICE (IMPLANTABLE): HCPCS | Performed by: ORTHOPAEDIC SURGERY

## 2025-04-23 PROCEDURE — C1713 ANCHOR/SCREW BN/BN,TIS/BN: HCPCS | Performed by: ORTHOPAEDIC SURGERY

## 2025-04-23 PROCEDURE — 999N000065 XR KNEE PORT LEFT 1/2 VIEWS: Mod: LT

## 2025-04-23 PROCEDURE — 370N000017 HC ANESTHESIA TECHNICAL FEE, PER MIN: Performed by: ORTHOPAEDIC SURGERY

## 2025-04-23 PROCEDURE — 258N000003 HC RX IP 258 OP 636

## 2025-04-23 PROCEDURE — 360N000077 HC SURGERY LEVEL 4, PER MIN: Performed by: ORTHOPAEDIC SURGERY

## 2025-04-23 PROCEDURE — 250N000013 HC RX MED GY IP 250 OP 250 PS 637: Performed by: ORTHOPAEDIC SURGERY

## 2025-04-23 PROCEDURE — 250N000011 HC RX IP 250 OP 636: Performed by: ORTHOPAEDIC SURGERY

## 2025-04-23 PROCEDURE — 272N000001 HC OR GENERAL SUPPLY STERILE: Performed by: ORTHOPAEDIC SURGERY

## 2025-04-23 PROCEDURE — 258N000003 HC RX IP 258 OP 636: Performed by: NURSE ANESTHETIST, CERTIFIED REGISTERED

## 2025-04-23 PROCEDURE — 710N000010 HC RECOVERY PHASE 1, LEVEL 2, PER MIN: Performed by: ORTHOPAEDIC SURGERY

## 2025-04-23 PROCEDURE — 250N000011 HC RX IP 250 OP 636: Mod: JZ

## 2025-04-23 PROCEDURE — 250N000011 HC RX IP 250 OP 636: Performed by: STUDENT IN AN ORGANIZED HEALTH CARE EDUCATION/TRAINING PROGRAM

## 2025-04-23 PROCEDURE — 250N000009 HC RX 250: Performed by: PHYSICIAN ASSISTANT

## 2025-04-23 PROCEDURE — 999N000141 HC STATISTIC PRE-PROCEDURE NURSING ASSESSMENT: Performed by: ORTHOPAEDIC SURGERY

## 2025-04-23 PROCEDURE — 258N000003 HC RX IP 258 OP 636: Performed by: ORTHOPAEDIC SURGERY

## 2025-04-23 PROCEDURE — 250N000009 HC RX 250: Performed by: ORTHOPAEDIC SURGERY

## 2025-04-23 PROCEDURE — 250N000011 HC RX IP 250 OP 636: Performed by: PHYSICIAN ASSISTANT

## 2025-04-23 PROCEDURE — 250N000013 HC RX MED GY IP 250 OP 250 PS 637: Performed by: PHYSICIAN ASSISTANT

## 2025-04-23 PROCEDURE — 64447 NJX AA&/STRD FEMORAL NRV IMG: CPT | Mod: XU

## 2025-04-23 PROCEDURE — 258N000003 HC RX IP 258 OP 636: Performed by: STUDENT IN AN ORGANIZED HEALTH CARE EDUCATION/TRAINING PROGRAM

## 2025-04-23 PROCEDURE — 250N000009 HC RX 250: Performed by: NURSE ANESTHETIST, CERTIFIED REGISTERED

## 2025-04-23 DEVICE — IMPLANTABLE DEVICE
Type: IMPLANTABLE DEVICE | Site: KNEE | Status: FUNCTIONAL
Brand: PERSONA®

## 2025-04-23 DEVICE — IMPLANTABLE DEVICE
Type: IMPLANTABLE DEVICE | Site: KNEE | Status: FUNCTIONAL
Brand: PERSONA® VIVACIT-E®

## 2025-04-23 DEVICE — IMP BONE CEMENT STRK SIMPLEX HV FULL DOSE 6194-1-001: Type: IMPLANTABLE DEVICE | Site: KNEE | Status: FUNCTIONAL

## 2025-04-23 DEVICE — IMPLANTABLE DEVICE
Type: IMPLANTABLE DEVICE | Site: KNEE | Status: FUNCTIONAL
Brand: PERSONA® NATURAL TIBIA®

## 2025-04-23 RX ORDER — LOSARTAN POTASSIUM 50 MG/1
50 TABLET ORAL 2 TIMES DAILY
Status: DISCONTINUED | OUTPATIENT
Start: 2025-04-23 | End: 2025-04-24 | Stop reason: HOSPADM

## 2025-04-23 RX ORDER — BUPIVACAINE HYDROCHLORIDE 7.5 MG/ML
INJECTION, SOLUTION INTRASPINAL
Status: COMPLETED | OUTPATIENT
Start: 2025-04-23 | End: 2025-04-23

## 2025-04-23 RX ORDER — FENTANYL CITRATE 50 UG/ML
25 INJECTION, SOLUTION INTRAMUSCULAR; INTRAVENOUS EVERY 5 MIN PRN
Status: DISCONTINUED | OUTPATIENT
Start: 2025-04-23 | End: 2025-04-23 | Stop reason: HOSPADM

## 2025-04-23 RX ORDER — HYDROMORPHONE HCL IN WATER/PF 6 MG/30 ML
0.2 PATIENT CONTROLLED ANALGESIA SYRINGE INTRAVENOUS EVERY 4 HOURS PRN
Status: DISCONTINUED | OUTPATIENT
Start: 2025-04-23 | End: 2025-04-24 | Stop reason: HOSPADM

## 2025-04-23 RX ORDER — ONDANSETRON 2 MG/ML
4 INJECTION INTRAMUSCULAR; INTRAVENOUS EVERY 6 HOURS PRN
Status: DISCONTINUED | OUTPATIENT
Start: 2025-04-23 | End: 2025-04-24 | Stop reason: HOSPADM

## 2025-04-23 RX ORDER — ACETAMINOPHEN 325 MG/1
975 TABLET ORAL EVERY 8 HOURS
Status: DISCONTINUED | OUTPATIENT
Start: 2025-04-23 | End: 2025-04-24 | Stop reason: HOSPADM

## 2025-04-23 RX ORDER — POTASSIUM CHLORIDE 1500 MG/1
20 TABLET, EXTENDED RELEASE ORAL DAILY
Status: DISCONTINUED | OUTPATIENT
Start: 2025-04-24 | End: 2025-04-24 | Stop reason: HOSPADM

## 2025-04-23 RX ORDER — CEFAZOLIN SODIUM/WATER 2 G/20 ML
2 SYRINGE (ML) INTRAVENOUS SEE ADMIN INSTRUCTIONS
Status: DISCONTINUED | OUTPATIENT
Start: 2025-04-23 | End: 2025-04-23 | Stop reason: HOSPADM

## 2025-04-23 RX ORDER — FLUMAZENIL 0.1 MG/ML
0.2 INJECTION, SOLUTION INTRAVENOUS
Status: DISCONTINUED | OUTPATIENT
Start: 2025-04-23 | End: 2025-04-23 | Stop reason: HOSPADM

## 2025-04-23 RX ORDER — ACETAMINOPHEN 325 MG/1
975 TABLET ORAL ONCE
Status: COMPLETED | OUTPATIENT
Start: 2025-04-23 | End: 2025-04-23

## 2025-04-23 RX ORDER — FENTANYL CITRATE 50 UG/ML
50 INJECTION, SOLUTION INTRAMUSCULAR; INTRAVENOUS EVERY 5 MIN PRN
Status: DISCONTINUED | OUTPATIENT
Start: 2025-04-23 | End: 2025-04-23 | Stop reason: HOSPADM

## 2025-04-23 RX ORDER — PROPOFOL 10 MG/ML
INJECTION, EMULSION INTRAVENOUS CONTINUOUS PRN
Status: DISCONTINUED | OUTPATIENT
Start: 2025-04-23 | End: 2025-04-23

## 2025-04-23 RX ORDER — MONTELUKAST SODIUM 10 MG/1
10 TABLET ORAL AT BEDTIME
Status: DISCONTINUED | OUTPATIENT
Start: 2025-04-23 | End: 2025-04-24 | Stop reason: HOSPADM

## 2025-04-23 RX ORDER — SPIRONOLACTONE 25 MG/1
25 TABLET ORAL DAILY
Status: DISCONTINUED | OUTPATIENT
Start: 2025-04-24 | End: 2025-04-24 | Stop reason: HOSPADM

## 2025-04-23 RX ORDER — ONDANSETRON 2 MG/ML
4 INJECTION INTRAMUSCULAR; INTRAVENOUS EVERY 30 MIN PRN
Status: DISCONTINUED | OUTPATIENT
Start: 2025-04-23 | End: 2025-04-23 | Stop reason: HOSPADM

## 2025-04-23 RX ORDER — ONDANSETRON 4 MG/1
4 TABLET, ORALLY DISINTEGRATING ORAL EVERY 30 MIN PRN
Status: DISCONTINUED | OUTPATIENT
Start: 2025-04-23 | End: 2025-04-23 | Stop reason: HOSPADM

## 2025-04-23 RX ORDER — CEFAZOLIN SODIUM 1 G/3ML
1 INJECTION, POWDER, FOR SOLUTION INTRAMUSCULAR; INTRAVENOUS EVERY 8 HOURS
Status: COMPLETED | OUTPATIENT
Start: 2025-04-23 | End: 2025-04-24

## 2025-04-23 RX ORDER — ASPIRIN 81 MG/1
81 TABLET ORAL 2 TIMES DAILY
Status: DISCONTINUED | OUTPATIENT
Start: 2025-04-23 | End: 2025-04-24 | Stop reason: HOSPADM

## 2025-04-23 RX ORDER — TRANEXAMIC ACID 650 MG/1
1950 TABLET ORAL ONCE
Status: COMPLETED | OUTPATIENT
Start: 2025-04-23 | End: 2025-04-23

## 2025-04-23 RX ORDER — ACETAMINOPHEN 325 MG/1
975 TABLET ORAL ONCE
Status: DISCONTINUED | OUTPATIENT
Start: 2025-04-23 | End: 2025-04-23 | Stop reason: HOSPADM

## 2025-04-23 RX ORDER — NALOXONE HYDROCHLORIDE 0.4 MG/ML
0.1 INJECTION, SOLUTION INTRAMUSCULAR; INTRAVENOUS; SUBCUTANEOUS
Status: DISCONTINUED | OUTPATIENT
Start: 2025-04-23 | End: 2025-04-23 | Stop reason: HOSPADM

## 2025-04-23 RX ORDER — LIDOCAINE 40 MG/G
CREAM TOPICAL
Status: DISCONTINUED | OUTPATIENT
Start: 2025-04-23 | End: 2025-04-24 | Stop reason: HOSPADM

## 2025-04-23 RX ORDER — OXYCODONE HYDROCHLORIDE 5 MG/1
5 TABLET ORAL
Status: DISCONTINUED | OUTPATIENT
Start: 2025-04-23 | End: 2025-04-23 | Stop reason: HOSPADM

## 2025-04-23 RX ORDER — NALOXONE HYDROCHLORIDE 0.4 MG/ML
0.4 INJECTION, SOLUTION INTRAMUSCULAR; INTRAVENOUS; SUBCUTANEOUS
Status: DISCONTINUED | OUTPATIENT
Start: 2025-04-23 | End: 2025-04-24 | Stop reason: HOSPADM

## 2025-04-23 RX ORDER — SODIUM CHLORIDE, SODIUM LACTATE, POTASSIUM CHLORIDE, CALCIUM CHLORIDE 600; 310; 30; 20 MG/100ML; MG/100ML; MG/100ML; MG/100ML
INJECTION, SOLUTION INTRAVENOUS CONTINUOUS
Status: DISCONTINUED | OUTPATIENT
Start: 2025-04-23 | End: 2025-04-23 | Stop reason: HOSPADM

## 2025-04-23 RX ORDER — MAGNESIUM OXIDE 400 MG/1
400 TABLET ORAL DAILY
Status: DISCONTINUED | OUTPATIENT
Start: 2025-04-24 | End: 2025-04-24 | Stop reason: HOSPADM

## 2025-04-23 RX ORDER — CHLORTHALIDONE 25 MG/1
50 TABLET ORAL DAILY
Status: DISCONTINUED | OUTPATIENT
Start: 2025-04-24 | End: 2025-04-24 | Stop reason: HOSPADM

## 2025-04-23 RX ORDER — HYDROMORPHONE HCL IN WATER/PF 6 MG/30 ML
0.2 PATIENT CONTROLLED ANALGESIA SYRINGE INTRAVENOUS EVERY 5 MIN PRN
Status: DISCONTINUED | OUTPATIENT
Start: 2025-04-23 | End: 2025-04-23 | Stop reason: HOSPADM

## 2025-04-23 RX ORDER — AMOXICILLIN 500 MG/1
2000 CAPSULE ORAL DAILY PRN
COMMUNITY

## 2025-04-23 RX ORDER — SODIUM CHLORIDE, SODIUM LACTATE, POTASSIUM CHLORIDE, CALCIUM CHLORIDE 600; 310; 30; 20 MG/100ML; MG/100ML; MG/100ML; MG/100ML
INJECTION, SOLUTION INTRAVENOUS CONTINUOUS
Status: DISCONTINUED | OUTPATIENT
Start: 2025-04-23 | End: 2025-04-24 | Stop reason: HOSPADM

## 2025-04-23 RX ORDER — BUPIVACAINE HYDROCHLORIDE 5 MG/ML
INJECTION, SOLUTION EPIDURAL; INTRACAUDAL; PERINEURAL
Status: COMPLETED | OUTPATIENT
Start: 2025-04-23 | End: 2025-04-23

## 2025-04-23 RX ORDER — IPRATROPIUM BROMIDE 42 UG/1
2 SPRAY, METERED NASAL DAILY
Status: DISCONTINUED | OUTPATIENT
Start: 2025-04-24 | End: 2025-04-24 | Stop reason: HOSPADM

## 2025-04-23 RX ORDER — BISACODYL 10 MG
10 SUPPOSITORY, RECTAL RECTAL DAILY PRN
Status: DISCONTINUED | OUTPATIENT
Start: 2025-04-23 | End: 2025-04-24 | Stop reason: HOSPADM

## 2025-04-23 RX ORDER — NALOXONE HYDROCHLORIDE 0.4 MG/ML
0.2 INJECTION, SOLUTION INTRAMUSCULAR; INTRAVENOUS; SUBCUTANEOUS
Status: DISCONTINUED | OUTPATIENT
Start: 2025-04-23 | End: 2025-04-24 | Stop reason: HOSPADM

## 2025-04-23 RX ORDER — ALBUTEROL SULFATE 90 UG/1
1-2 INHALANT RESPIRATORY (INHALATION) EVERY 4 HOURS PRN
Status: DISCONTINUED | OUTPATIENT
Start: 2025-04-23 | End: 2025-04-24 | Stop reason: HOSPADM

## 2025-04-23 RX ORDER — CELECOXIB 100 MG/1
100 CAPSULE ORAL 2 TIMES DAILY
Status: DISCONTINUED | OUTPATIENT
Start: 2025-04-23 | End: 2025-04-24 | Stop reason: HOSPADM

## 2025-04-23 RX ORDER — ATORVASTATIN CALCIUM 10 MG/1
10 TABLET, FILM COATED ORAL AT BEDTIME
Status: DISCONTINUED | OUTPATIENT
Start: 2025-04-23 | End: 2025-04-24 | Stop reason: HOSPADM

## 2025-04-23 RX ORDER — DEXAMETHASONE SODIUM PHOSPHATE 10 MG/ML
INJECTION, SOLUTION INTRAMUSCULAR; INTRAVENOUS PRN
Status: DISCONTINUED | OUTPATIENT
Start: 2025-04-23 | End: 2025-04-23

## 2025-04-23 RX ORDER — FENTANYL CITRATE 50 UG/ML
25-100 INJECTION, SOLUTION INTRAMUSCULAR; INTRAVENOUS
Status: DISCONTINUED | OUTPATIENT
Start: 2025-04-23 | End: 2025-04-23 | Stop reason: HOSPADM

## 2025-04-23 RX ORDER — LACTOBACILLUS RHAMNOSUS GG 10B CELL
1 CAPSULE ORAL DAILY
Status: DISCONTINUED | OUTPATIENT
Start: 2025-04-24 | End: 2025-04-24 | Stop reason: HOSPADM

## 2025-04-23 RX ORDER — THERA TABS 400 MCG
1 TAB ORAL DAILY
Status: DISCONTINUED | OUTPATIENT
Start: 2025-04-24 | End: 2025-04-24 | Stop reason: HOSPADM

## 2025-04-23 RX ORDER — VITAMIN B COMPLEX
50 TABLET ORAL DAILY
Status: DISCONTINUED | OUTPATIENT
Start: 2025-04-24 | End: 2025-04-24 | Stop reason: HOSPADM

## 2025-04-23 RX ORDER — HYDROMORPHONE HCL IN WATER/PF 6 MG/30 ML
0.4 PATIENT CONTROLLED ANALGESIA SYRINGE INTRAVENOUS EVERY 5 MIN PRN
Status: DISCONTINUED | OUTPATIENT
Start: 2025-04-23 | End: 2025-04-23 | Stop reason: HOSPADM

## 2025-04-23 RX ORDER — AMOXICILLIN 250 MG
1 CAPSULE ORAL 2 TIMES DAILY
Status: DISCONTINUED | OUTPATIENT
Start: 2025-04-23 | End: 2025-04-24 | Stop reason: HOSPADM

## 2025-04-23 RX ORDER — FLUTICASONE FUROATE AND VILANTEROL 200; 25 UG/1; UG/1
1 POWDER RESPIRATORY (INHALATION) DAILY
Status: DISCONTINUED | OUTPATIENT
Start: 2025-04-24 | End: 2025-04-24 | Stop reason: HOSPADM

## 2025-04-23 RX ORDER — PROCHLORPERAZINE MALEATE 5 MG/1
5 TABLET ORAL EVERY 6 HOURS PRN
Status: DISCONTINUED | OUTPATIENT
Start: 2025-04-23 | End: 2025-04-24 | Stop reason: HOSPADM

## 2025-04-23 RX ORDER — HYDROMORPHONE HCL IN WATER/PF 6 MG/30 ML
0.1 PATIENT CONTROLLED ANALGESIA SYRINGE INTRAVENOUS EVERY 4 HOURS PRN
Status: DISCONTINUED | OUTPATIENT
Start: 2025-04-23 | End: 2025-04-24 | Stop reason: HOSPADM

## 2025-04-23 RX ORDER — CLOPIDOGREL BISULFATE 75 MG/1
75 TABLET ORAL DAILY
Status: DISCONTINUED | OUTPATIENT
Start: 2025-04-24 | End: 2025-04-24 | Stop reason: HOSPADM

## 2025-04-23 RX ORDER — POLYETHYLENE GLYCOL 3350 17 G/17G
17 POWDER, FOR SOLUTION ORAL DAILY
Status: DISCONTINUED | OUTPATIENT
Start: 2025-04-24 | End: 2025-04-24 | Stop reason: HOSPADM

## 2025-04-23 RX ORDER — OXYCODONE HYDROCHLORIDE 5 MG/1
5 TABLET ORAL EVERY 4 HOURS PRN
Status: DISCONTINUED | OUTPATIENT
Start: 2025-04-23 | End: 2025-04-24 | Stop reason: HOSPADM

## 2025-04-23 RX ORDER — DEXAMETHASONE SODIUM PHOSPHATE 4 MG/ML
4 INJECTION, SOLUTION INTRA-ARTICULAR; INTRALESIONAL; INTRAMUSCULAR; INTRAVENOUS; SOFT TISSUE
Status: DISCONTINUED | OUTPATIENT
Start: 2025-04-23 | End: 2025-04-23 | Stop reason: HOSPADM

## 2025-04-23 RX ORDER — LIDOCAINE 40 MG/G
CREAM TOPICAL
Status: DISCONTINUED | OUTPATIENT
Start: 2025-04-23 | End: 2025-04-23 | Stop reason: HOSPADM

## 2025-04-23 RX ORDER — ONDANSETRON 4 MG/1
4 TABLET, ORALLY DISINTEGRATING ORAL EVERY 6 HOURS PRN
Status: DISCONTINUED | OUTPATIENT
Start: 2025-04-23 | End: 2025-04-24 | Stop reason: HOSPADM

## 2025-04-23 RX ORDER — ONDANSETRON 2 MG/ML
INJECTION INTRAMUSCULAR; INTRAVENOUS PRN
Status: DISCONTINUED | OUTPATIENT
Start: 2025-04-23 | End: 2025-04-23

## 2025-04-23 RX ORDER — OXYCODONE HYDROCHLORIDE 5 MG/1
10 TABLET ORAL
Status: DISCONTINUED | OUTPATIENT
Start: 2025-04-23 | End: 2025-04-23 | Stop reason: HOSPADM

## 2025-04-23 RX ORDER — CELECOXIB 200 MG/1
400 CAPSULE ORAL ONCE
Status: COMPLETED | OUTPATIENT
Start: 2025-04-23 | End: 2025-04-23

## 2025-04-23 RX ORDER — CEFAZOLIN SODIUM/WATER 2 G/20 ML
2 SYRINGE (ML) INTRAVENOUS
Status: COMPLETED | OUTPATIENT
Start: 2025-04-23 | End: 2025-04-23

## 2025-04-23 RX ADMIN — SODIUM CHLORIDE, SODIUM LACTATE, POTASSIUM CHLORIDE, AND CALCIUM CHLORIDE: .6; .31; .03; .02 INJECTION, SOLUTION INTRAVENOUS at 12:26

## 2025-04-23 RX ADMIN — BUPIVACAINE HYDROCHLORIDE 15 ML: 5 INJECTION, SOLUTION EPIDURAL; INTRACAUDAL; PERINEURAL at 13:07

## 2025-04-23 RX ADMIN — DEXAMETHASONE SODIUM PHOSPHATE 10 MG: 10 INJECTION, SOLUTION INTRAMUSCULAR; INTRAVENOUS at 13:52

## 2025-04-23 RX ADMIN — SENNOSIDES AND DOCUSATE SODIUM 1 TABLET: 50; 8.6 TABLET ORAL at 20:57

## 2025-04-23 RX ADMIN — TRANEXAMIC ACID 1950 MG: 650 TABLET ORAL at 11:53

## 2025-04-23 RX ADMIN — CELECOXIB 400 MG: 200 CAPSULE ORAL at 11:53

## 2025-04-23 RX ADMIN — DEXMEDETOMIDINE HYDROCHLORIDE 4 MCG: 100 INJECTION, SOLUTION INTRAVENOUS at 14:28

## 2025-04-23 RX ADMIN — Medication 2 G: at 13:29

## 2025-04-23 RX ADMIN — PHENYLEPHRINE HYDROCHLORIDE 100 MCG: 10 INJECTION INTRAVENOUS at 14:32

## 2025-04-23 RX ADMIN — LOSARTAN POTASSIUM 50 MG: 50 TABLET, FILM COATED ORAL at 23:36

## 2025-04-23 RX ADMIN — CEFAZOLIN 1 G: 1 INJECTION, POWDER, FOR SOLUTION INTRAMUSCULAR; INTRAVENOUS at 20:57

## 2025-04-23 RX ADMIN — OXYCODONE HYDROCHLORIDE 2.5 MG: 5 TABLET ORAL at 19:06

## 2025-04-23 RX ADMIN — OXYCODONE HYDROCHLORIDE 2.5 MG: 5 TABLET ORAL at 23:36

## 2025-04-23 RX ADMIN — ACETAMINOPHEN 975 MG: 325 TABLET, FILM COATED ORAL at 17:59

## 2025-04-23 RX ADMIN — FENTANYL CITRATE 100 MCG: 50 INJECTION INTRAMUSCULAR; INTRAVENOUS at 13:04

## 2025-04-23 RX ADMIN — MONTELUKAST 10 MG: 10 TABLET, FILM COATED ORAL at 23:36

## 2025-04-23 RX ADMIN — MIDAZOLAM HYDROCHLORIDE 1 MG: 1 INJECTION, SOLUTION INTRAMUSCULAR; INTRAVENOUS at 13:04

## 2025-04-23 RX ADMIN — POLYETHYLENE GLYCOL AND PROPYLENE GLYCOL 1 DROP: 4; 3 SOLUTION/ DROPS OPHTHALMIC at 23:37

## 2025-04-23 RX ADMIN — BUPIVACAINE HYDROCHLORIDE IN DEXTROSE 1.6 ML: 7.5 INJECTION, SOLUTION SUBARACHNOID at 13:38

## 2025-04-23 RX ADMIN — ATORVASTATIN CALCIUM 10 MG: 10 TABLET, FILM COATED ORAL at 23:36

## 2025-04-23 RX ADMIN — SODIUM CHLORIDE, SODIUM LACTATE, POTASSIUM CHLORIDE, AND CALCIUM CHLORIDE: .6; .31; .03; .02 INJECTION, SOLUTION INTRAVENOUS at 17:55

## 2025-04-23 RX ADMIN — ASPIRIN 81 MG: 81 TABLET, COATED ORAL at 20:57

## 2025-04-23 RX ADMIN — PROPOFOL 100 MCG/KG/MIN: 10 INJECTION, EMULSION INTRAVENOUS at 13:41

## 2025-04-23 RX ADMIN — DEXMEDETOMIDINE HYDROCHLORIDE 12 MCG: 100 INJECTION, SOLUTION INTRAVENOUS at 13:38

## 2025-04-23 RX ADMIN — ONDANSETRON 4 MG: 2 INJECTION INTRAMUSCULAR; INTRAVENOUS at 13:51

## 2025-04-23 ASSESSMENT — ACTIVITIES OF DAILY LIVING (ADL)
ADLS_ACUITY_SCORE: 23
ADLS_ACUITY_SCORE: 22
ADLS_ACUITY_SCORE: 22
ADLS_ACUITY_SCORE: 26
ADLS_ACUITY_SCORE: 23
ADLS_ACUITY_SCORE: 22
ADLS_ACUITY_SCORE: 22
ADLS_ACUITY_SCORE: 23
ADLS_ACUITY_SCORE: 23
ADLS_ACUITY_SCORE: 22
ADLS_ACUITY_SCORE: 23
ADLS_ACUITY_SCORE: 22

## 2025-04-23 ASSESSMENT — COPD QUESTIONNAIRES
COPD: 1
CAT_SEVERITY: MILD

## 2025-04-23 NOTE — PROGRESS NOTES
Patient vital signs are at baseline: Yes  Patient able to ambulate as they were prior to admission or with assist devices provided by therapies during their stay:  No,  Reason:  Patient was bleeding through second dressing change when she arrived on the floor. Ice applied and leg elevated on pillows. ANTIONETTE stoner and Dr. Purdy notified and ordered to reinforce with ACE and ABD PRN. Bleeding slowed down around 1900. NOC RN notified at shift change.   Patient MUST void prior to discharge:  No,  Reason:  Not OOB yet  Patient able to tolerate oral intake:  Yes  Pain has adequate pain control using Oral analgesics:  Yes  Does patient have an identified :  Yes  Has goal D/C date and time been discussed with patient:  Yes

## 2025-04-23 NOTE — OP NOTE
Preoperative diagnosis: Left knee osteoarthrosis.     Postoperative diagnosis: Left knee osteoarthrosis end-stage    Procedure performed: Left total knee arthroplasty    Surgeon: Camilo Graff M.D.    First assistant: Adriana Fan was used in this case to assist in retraction, bony cuts, trial implantation, and permanent implantation of total knee arthroplasty as well as wound closure and dressing placement.    Anesthesia: Spinal    Complications: None    Estimated blood loss: 50 mL    Findings: Grade 4 tricompartmental osteoarthritis     Implants Used:     John [employer] Persona size 6 Left CR femur, size D Left the stemmed tibia, 14mm CR polyethylene, 29mm patella.    Indications:    This patient has severe pain secondary to severe left knee osteoarthrosis.  This patient has failed or elected to forego nonoperative care including but not limited to physical therapy, injections, and pain control with medication.  This patient has significant pain affecting quality of life.  After understanding risks, benefits, alternatives, potential outcomes, the nature of the procedure, and rehabilitation the patient undergo left total knee arthroplasty.    Procedure:    The patient was identified in the preoperative holding area and the left knee was marked as the operative site.  The patient was brought to the operating suite where spinal anesthesia was provided by the anesthesia staff.  The left lower extremity was prepped and draped in the standard sterile fashion.  A timeout was performed.  The patient received antibiotics prior to incision.  We exsanguinated and elevated the thigh tourniquet to 300 mmHg.    A midline incision was made for a medial parapatellar approach.  The fat pad and ACL were excised.  An intramedullary guide was used to align the distal femoral cut at 5  valgus.  The 4-in-1 cutting block was sized and stabilized for the remainder of the distal femoral resections.  Next an extra  medullary tibial cutting guide was used to resect the proximal tibia in neutral alignment.  Posterior osteophytes and the menisci were excised.  We undercut the patella and reamed for the patella.  The above-noted implants were trialed.  The knee had excellent stability from 0-90  of knee flexion with range of motion 0 to 130.  The knee was stable varus valgus stress at 0 and 90 .  Equal flexion extension gaps.  Excellent tracking of the patella with the no hands test.    The trial implants were removed.  We reamed and broached for the femur and the tibia.  The bone was copiously irrigated and dried.  We cemented our implants and the place.  Simplex cement was used.  We allowed the cement to dry and removed excess cement.  The tourniquet was deflated.  We copiously irrigated.  Hemostasis was achieved.  A layered closure was performed. Sterile dressings were placed.    The patient will be admitted to the hospital status post left total knee arthroplasty for postoperative pain control, DVT prophylaxis, physical therapy, and medical management.  The patient is weightbearing as tolerated.  The patient will be on ASA 81 mg p.o. twice daily x 6 weeks.  Estimated length of stay is 1 midnights.      Return to clinic in 2 weeks for reevaluation and x-rays.        Camilo Graff MD

## 2025-04-23 NOTE — ANESTHESIA PREPROCEDURE EVALUATION
Anesthesia Pre-Procedure Evaluation    Patient: Aster Pierson   MRN: [id number] : 1940        Procedure : Procedure(s):  LEFT TOTAL KNEE ARTHROPLASTY          Past Medical History:   Diagnosis Date    Antiplatelet or antithrombotic long-term use     Arthritis     Benign essential tremor     head shakes when stressed    COPD (chronic obstructive pulmonary disease) (H)     Depression     Gastroesophageal reflux disease     History of melanoma     History of skin cancer     Hyperlipidemia     Hypertension     Interstitial cystitis     Lower extremity edema     Osteopenia     PVD (peripheral vascular disease)     Recurrent UTI     Sleep apnea     CPAP    Uncomplicated asthma     with activity      Past Surgical History:   Procedure Laterality Date    COLONOSCOPY      HYSTERECTOMY TOTAL ABDOMINAL, BILATERAL SALPINGO-OOPHORECTOMY, COMBINED      IR LOWER EXTREMITY ANGIOGRAM RIGHT  2023    IR LOWER EXTREMITY ANGIOGRAM RIGHT  10/26/2020    IR LOWER EXTREMITY ANGIOGRAM RIGHT  06/10/2020    POPLITEAL ARTERY STENT Right 2021    SKIN CANCER EXCISION Right     right neck and shoulders    TOTAL KNEE ARTHROPLASTY Right 2020      No Known Allergies   Social History     Tobacco Use    Smoking status: Former     Types: Cigarettes    Smokeless tobacco: Never    Tobacco comments:     Quit smoking    Substance Use Topics    Alcohol use: Yes     Comment: 2 drinks per month      Wt Readings from Last 1 Encounters:   25 78.5 kg (173 lb)        Anesthesia Evaluation   Pt has had prior anesthetic.     No history of anesthetic complications       ROS/MED HX  ENT/Pulmonary:     (+) sleep apnea, uses CPAP,                   Mild Persistent, asthma   mild,  COPD,              Neurologic: Comment: Essential tremor      Cardiovascular:     (+) Dyslipidemia hypertension- Peripheral Vascular Disease-   -  - stent (RLE peripheral vascular stent. on plavix)-   Taking blood thinners                              "   (-) murmur   METS/Exercise Tolerance: 4 - Raking leaves, gardening    Hematologic:  - neg hematologic  ROS     Musculoskeletal:  - neg musculoskeletal ROS     GI/Hepatic:     (+) GERD,                   Renal/Genitourinary:       Endo:  - neg endo ROS     Psychiatric/Substance Use:  - neg psychiatric ROS     Infectious Disease:  - neg infectious disease ROS     Malignancy:       Other:            Physical Exam    Airway        Mallampati: II   TM distance: > 3 FB   Neck ROM: full   Mouth opening: > 3 cm    Respiratory Devices and Support         Dental       (+) Minor Abnormalities - some fillings, tiny chips      Cardiovascular          Rhythm and rate: regular and normal (-) no murmur    Pulmonary           breath sounds clear to auscultation           OUTSIDE LABS:  CBC: No results found for: \"WBC\", \"HGB\", \"HCT\", \"PLT\"  BMP: No results found for: \"NA\", \"POTASSIUM\", \"CHLORIDE\", \"CO2\", \"BUN\", \"CR\", \"GLC\"  COAGS: No results found for: \"PTT\", \"INR\", \"FIBR\"  POC: No results found for: \"BGM\", \"HCG\", \"HCGS\"  HEPATIC: No results found for: \"ALBUMIN\", \"PROTTOTAL\", \"ALT\", \"AST\", \"GGT\", \"ALKPHOS\", \"BILITOTAL\", \"BILIDIRECT\", \"CARLIE\"  OTHER: No results found for: \"PH\", \"LACT\", \"A1C\", \"IRENE\", \"PHOS\", \"MAG\", \"LIPASE\", \"AMYLASE\", \"TSH\", \"T4\", \"T3\", \"CRP\", \"SED\"    Anesthesia Plan    ASA Status:  3    NPO Status:  NPO Appropriate    Anesthesia Type: Spinal.              Consents    Anesthesia Plan(s) and associated risks, benefits, and realistic alternatives discussed. Questions answered and patient/representative(s) expressed understanding.     - Discussed: Risks, Benefits and Alternatives for BOTH SEDATION and the PROCEDURE were discussed     - Discussed with:  Patient      - Extended Intubation/Ventilatory Support Discussed: No.      - Patient is DNR/DNI Status: No     Use of blood products discussed: Yes.     - Discussed with: Patient.     - Consented: consented to blood products     Postoperative Care    Pain management: " "IV analgesics, Oral pain medications, Peripheral nerve block (Single Shot), Multi-modal analgesia.   PONV prophylaxis: Ondansetron (or other 5HT-3), Dexamethasone or Solumedrol, Background Propofol Infusion     Comments:    Other Comments: Right knee done under spinal anesthesia 5 years ago. Held plavix for 7 days. Okay for spinal with sedation.    Spinal with propofol gtt. Decadron/zofran, fentanyl PRN.               Dorota Soliz MD    Clinically Significant Risk Factors Present on Admission                 # Drug Induced Platelet Defect: home medication list includes an antiplatelet medication   # Hypertension: Home medication list includes antihypertensive(s)           # Overweight: Estimated body mass index is 29.39 kg/m  as calculated from the following:    Height as of this encounter: 1.634 m (5' 4.33\").    Weight as of this encounter: 78.5 kg (173 lb).       # Asthma: noted on problem list          "

## 2025-04-23 NOTE — PHARMACY-ADMISSION MEDICATION HISTORY
Pharmacist Admission Medication History    Admission medication history is complete. The information provided in this note is only as accurate as the sources available at the time of the update.    Information Source(s): Patient and CareEverywhere/[employer]Scripts via in-person    Pertinent Information: Patient has Atovent nasal spray and Refresh eye drops available for inpatient use. She plans to have family bring in her Breo inhaler for post op use. She states she will not need her albuterol while admitted.    Recent fill for amlodipine per [employer]s. Patient states she was switched from amlodipine to spironolactone.    Allergies reviewed with patient and updates made in EHR: yes    Medication History Completed By: Melissa Batista PharmD 4/23/2025 12:26 PM    PTA Med List   Medication Sig Last Dose/Taking    acetaminophen (TYLENOL) 500 MG tablet Take 1,000 mg by mouth every 6 hours as needed for mild pain. 4/23/2025 at  7:00 AM    albuterol (PROAIR HFA/PROVENTIL HFA/VENTOLIN HFA) 108 (90 Base) MCG/ACT inhaler Inhale 1-2 puffs into the lungs every 4 hours as needed for shortness of breath, wheezing or cough. Unknown    amoxicillin (AMOXIL) 500 MG capsule Take 2,000 mg by mouth daily as needed (1 hour prior to dental). More than a month    atorvastatin (LIPITOR) 10 MG tablet Take 10 mg by mouth at bedtime. 4/22/2025 Bedtime    BIOTIN MAXIMUM PO Take 6,000 mg by mouth daily. Vit. A, C, and E  Stopped 4/16/25 before surgery Past Week    chlorthalidone (HYGROTON) 50 MG tablet Take 50 mg by mouth daily. 4/22/2025 Morning    clopidogrel (PLAVIX) 75 MG tablet Take 75 mg by mouth daily. Last dose 4/15/25 before surgery Past Week    Coenzyme Q10 (COQ10 PO) Take 1 capsule by mouth daily. With turmeric and black pepper Past Week    Cranberry 500 MG CAPS Take 1 capsule by mouth 2 times daily. 4/23/2025 Morning    FIBER ADULT GUMMIES PO Take 2 Gum by mouth daily. 4/22/2025    FLUoxetine 20 MG tablet Take 20 mg by mouth daily.  4/23/2025    fluticasone-vilanterol (BREO ELLIPTA) 200-25 MCG/ACT inhaler Inhale 1 puff into the lungs daily. 4/22/2025    ipratropium (ATROVENT) 0.06 % nasal spray Spray 2 sprays into both nostrils daily. 4/23/2025    lactobacillus rhamnosus, GG, (CULTURELL) capsule Take 1 capsule by mouth daily. 4/22/2025    losartan (COZAAR) 50 MG tablet Take 50 mg by mouth 2 times daily. 4/22/2025 Morning    Magnesium Citrate (MAGNESIUM GUMMIES PO) Take 330 mg by mouth daily. Calm 4/22/2025 Morning    montelukast (SINGULAIR) 10 MG tablet Take 10 mg by mouth at bedtime. 4/22/2025 Bedtime    multivitamin, therapeutic (THERA-VIT) TABS tablet Take 1 tablet by mouth daily. Stopped 4/16/25 before surgery Past Week    polyethylene glycol-propylene glycol (SYSTANE ULTRA) 0.4-0.3 % SOLN ophthalmic solution Place 1 drop into both eyes 2 times daily. 4/23/2025 Morning    potassium chloride ER (K-TAB) 20 MEQ CR tablet Take 20 mEq by mouth daily. 4/23/2025    spironolactone (ALDACTONE) 25 MG tablet Take 25 mg by mouth daily. 4/22/2025 Morning    TART CHERRY PO Take 1 capsule by mouth daily. Blueberry 645mg and tart cherry 180mg 4/22/2025 Evening    UNABLE TO FIND Take 645 mg by mouth daily. MEDICATION NAME: Tart Blueberry 4/22/2025 Morning    vitamin D3 (CHOLECALCIFEROL) 50 mcg (2000 units) tablet Take 1 tablet by mouth daily. 4/23/2025

## 2025-04-23 NOTE — INTERVAL H&P NOTE
"I have reviewed the surgical (or preoperative) H&P that is linked to this encounter, and examined the patient. There are no significant changes    Clinical Conditions Present on Arrival:  Clinically Significant Risk Factors Present on Admission                  # Drug Induced Platelet Defect: home medication list includes an antiplatelet medication      # Overweight: Estimated body mass index is 29.39 kg/m  as calculated from the following:    Height as of this encounter: 1.634 m (5' 4.33\").    Weight as of this encounter: 78.5 kg (173 lb).       "

## 2025-04-23 NOTE — BRIEF OP NOTE
[facility]    Brief Operative Note    Pre-operative diagnosis: Osteoarthritis, knee [M17.9] left    Post-operative diagnosis Same as pre-operative diagnosis    Procedure: LEFT TOTAL KNEE ARTHROPLASTY, Left - Knee    Surgeon: Surgeons and Role:     * Camilo Graff MD - Primary     * Adriana Fan PA-C  Anesthesia: Spinal   Estimated Blood Loss: Less than 50 ml    Drains: None  Specimens:   ID Type Source Tests Collected by Time Destination   A :   Knee, Left  Camilo Graff MD 4/23/2025  2:26 PM      Findings:   Grade 4 tricompartmental osteoarthritis .  Complications: None.  Implants:   Implant Name Type Inv. Item Serial No.  Lot No. LRB No. Used Action   KNEE FEMUR CR CEMENT CCR STD SZ 6 L - [id number] Total Joint Component/Insert KNEE FEMUR CR CEMENT CCR STD SZ 6 L  [employer] [id number] Left 1 Implanted   IMP TIBIAL ZIM PSN NP STM 5DEG SZ DL -169-01 - [id number] Total Joint Component/Insert IMP TIBIAL ZIM PSN NP STM 5DEG SZ DL -870-01  [employer] [id number] Left 1 Implanted   PATELLA ALL POLY 29MM - [id number] Total Joint Component/Insert PATELLA ALL POLY 29MM  [employer] [id number] Left 1 Implanted   SURFACE ARTC 14MM PERSONA DESTINY CN 6-7 C-D KN LT VIVACIT-E - [id number] Total Joint Component/Insert SURFACE ARTC 14MM PERSONA DESTINY CN 6-7 C-D KN LT VIVACIT-E  [employer] [id number] Left 1 Implanted   IMP BONE CEMENT STRK SIMPLEX HV FULL DOSE 6194-1-001 - [id number] Cement, Bone IMP BONE CEMENT STRK SIMPLEX HV FULL DOSE 6194-1-001  [employer] [id number] Left 1 Implanted

## 2025-04-23 NOTE — ANESTHESIA CARE TRANSFER NOTE
Patient: Aster Pierson    Procedure: Procedure(s):  LEFT TOTAL KNEE ARTHROPLASTY       Diagnosis: Osteoarthritis, knee [M17.9]  Diagnosis Additional Information: No value filed.    Anesthesia Type:   Spinal     Note:    Oropharynx: oropharynx clear of all foreign objects and spontaneously breathing  Level of Consciousness: drowsy  Oxygen Supplementation: face mask  Level of Supplemental Oxygen (L/min / FiO2): 6  Independent Airway: airway patency satisfactory and stable  Dentition: dentition unchanged  Vital Signs Stable: post-procedure vital signs reviewed and stable  Report to RN Given: handoff report given  Patient transferred to: PACU    Handoff Report: Identifed the Patient, Identified the Reponsible Provider, Reviewed the pertinent medical history, Discussed the surgical course, Reviewed Intra-OP anesthesia mangement and issues during anesthesia, Set expectations for post-procedure period and Allowed opportunity for questions and acknowledgement of understanding      Vitals:  Vitals Value Taken Time   /58 04/23/25 1454   Temp 36.9  C (98.42  F) 04/23/25 1454   Pulse 80 04/23/25 1454   Resp 23 04/23/25 1454   SpO2 100 % 04/23/25 1454   Vitals shown include unfiled device data.    Electronically Signed By: VAN Saavedra CRNA  April 23, 2025  2:56 PM

## 2025-04-23 NOTE — ANESTHESIA PROCEDURE NOTES
"Intrathecal injection Procedure Note    Pre-Procedure   Staff -        Anesthesiologist:  Dorota Soliz MD       Performed By: anesthesiologist       Location: OR       Procedure Start/Stop Times: 4/23/2025 1:38 PM and 4/23/2025 1:40 PM       Pre-Anesthestic Checklist: patient identified, IV checked, risks and benefits discussed, informed consent, monitors and equipment checked, pre-op evaluation, at physician/surgeon's request and post-op pain management  Timeout:       Correct Patient: Yes        Correct Procedure: Yes        Correct Site: Yes        Correct Position: Yes   Procedure Documentation  Procedure: intrathecal injection         Patient Position: sitting       Skin prep: Chloraprep       Insertion Site: L3-4. (midline approach).       Needle Gauge: 24.        Needle Length (Inches): 4        Spinal Needle Type: Pencan       Introducer used       Introducer: 20 G       # of attempts: 1 and  # of redirects:  0    Assessment/Narrative         Paresthesias: No.       CSF fluid: clear.       Opening pressure was cmH2O while  Sitting.      Medication(s) Administered   0.75% Hyperbaric Bupivacaine (Intrathecal) - Intrathecal   1.6 mL - 4/23/2025 1:38:00 PM  Medication Administration Time: 4/23/2025 1:38 PM     Comments:  Patient tolerated well, no complications noted. Clear, free-flowing CSF obtained. NO heme and NO paresthesias during procedure. +gentle barbotage at beginning, middle and end of injection. Easy injection. Setting up well.         FOR [facility] ([facility] [place]) ONLY:   Pain Team Contact information: please page the Pain Team Via [employer]. Search \"Pain\". During daytime hours, please page the attending first. At night please page the resident first.      "

## 2025-04-23 NOTE — ANESTHESIA PROCEDURE NOTES
Adductor canal Procedure Note    Pre-Procedure   Staff -        Anesthesiologist:  Dorota Soliz MD       Performed By: anesthesiologist       Location: pre-op       Procedure Start/Stop Times: 4/23/2025 1:07 PM and 4/23/2025 1:08 PM       Pre-Anesthestic Checklist: patient identified, IV checked, site marked, risks and benefits discussed, informed consent, monitors and equipment checked, pre-op evaluation, at physician/surgeon's request and post-op pain management  Timeout:       Correct Patient: Yes        Correct Procedure: Yes        Correct Site: Yes        Correct Position: Yes        Correct Laterality: Yes        Site Marked: Yes  Procedure Documentation  Procedure: Adductor canal         Laterality: left       Patient Position: supine       Skin prep: Chloraprep       Needle Type: other       Needle Gauge: 20.        Needle Length (Inches): 4        Ultrasound guided       1. Ultrasound was used to identify targeted nerve, plexus, vascular marker, or fascial plane and place a needle adjacent to it in real-time.       2. Ultrasound was used to visualize the spread of anesthetic in close proximity to the above referenced structure.       3. A permanent image is entered into the patient's record.       4. The visualized anatomic structures appeared normal.       5. There were no apparent abnormal pathologic findings.    Assessment/Narrative         The placement was negative for: blood aspirated, painful injection and site bleeding       Paresthesias: No.       Bolus given via needle..        Secured via.        Insertion/Infusion Method: Single Shot       Complications: none       Injection made incrementally with aspirations every 5 mL.    Medication(s) Administered   Bupivacaine 0.5% PF (Infiltration) - Infiltration   15 mL - 4/23/2025 1:07:00 PM  Medication Administration Time: 4/23/2025 1:07 PM     Comments:  Patient tolerated well, no complications noted. NO heme and NO paresthesias during  "procedure/incremental injection.        FOR [facility] ONLY:   Pain Team Contact information: please page the Pain Team Via [employer]. Search \"Pain\". During daytime hours, please page the attending first. At night please page the resident first.      "

## 2025-04-23 NOTE — ANESTHESIA POSTPROCEDURE EVALUATION
Patient: Aster Pierson    Procedure: Procedure(s):  LEFT TOTAL KNEE ARTHROPLASTY       Anesthesia Type:  Spinal    Note:     Postop Pain Control: Uneventful            Sign Out: Well controlled pain   PONV: No   Neuro/Psych: Uneventful            Sign Out: Acceptable/Baseline neuro status   Airway/Respiratory: Uneventful            Sign Out: Acceptable/Baseline resp. status   CV/Hemodynamics: Uneventful            Sign Out: Acceptable CV status; No obvious hypovolemia; No obvious fluid overload   Other NRE: NONE   DID A NON-ROUTINE EVENT OCCUR? No           Last vitals:  Vitals Value Taken Time   /68 04/23/25 1701   Temp 37  C (98.6  F) 04/23/25 1547   Pulse 92 04/23/25 1700   Resp 38 04/23/25 1547   SpO2 96 % 04/23/25 1700   Vitals shown include unfiled device data.    Electronically Signed By: Edgard Sanz MD  April 23, 2025  6:05 PM

## 2025-04-24 ENCOUNTER — APPOINTMENT (OUTPATIENT)
Dept: PHYSICAL THERAPY | Facility: CLINIC | Age: 85
End: 2025-04-24
Attending: ORTHOPAEDIC SURGERY
Payer: COMMERCIAL

## 2025-04-24 ENCOUNTER — APPOINTMENT (OUTPATIENT)
Dept: OCCUPATIONAL THERAPY | Facility: CLINIC | Age: 85
End: 2025-04-24
Attending: ORTHOPAEDIC SURGERY
Payer: COMMERCIAL

## 2025-04-24 VITALS
BODY MASS INDEX: 29.53 KG/M2 | HEART RATE: 83 BPM | DIASTOLIC BLOOD PRESSURE: 73 MMHG | TEMPERATURE: 97.7 F | RESPIRATION RATE: 18 BRPM | OXYGEN SATURATION: 98 % | SYSTOLIC BLOOD PRESSURE: 168 MMHG | HEIGHT: 64 IN | WEIGHT: 173 LBS

## 2025-04-24 LAB
ANION GAP SERPL CALCULATED.3IONS-SCNC: 10 MMOL/L (ref 7–15)
BUN SERPL-MCNC: 19.9 MG/DL (ref 8–23)
CALCIUM SERPL-MCNC: 9.1 MG/DL (ref 8.8–10.4)
CHLORIDE SERPL-SCNC: 102 MMOL/L (ref 98–107)
CREAT SERPL-MCNC: 0.78 MG/DL (ref 0.51–0.95)
EGFRCR SERPLBLD CKD-EPI 2021: 75 ML/MIN/1.73M2
FASTING STATUS PATIENT QL REPORTED: ABNORMAL
FASTING STATUS PATIENT QL REPORTED: ABNORMAL
GLUCOSE SERPL-MCNC: 129 MG/DL (ref 70–99)
GLUCOSE SERPL-MCNC: 131 MG/DL (ref 70–99)
HCO3 SERPL-SCNC: 25 MMOL/L (ref 22–29)
HGB BLD-MCNC: 10 G/DL (ref 11.7–15.7)
POTASSIUM SERPL-SCNC: 4.4 MMOL/L (ref 3.4–5.3)
SODIUM SERPL-SCNC: 137 MMOL/L (ref 135–145)

## 2025-04-24 PROCEDURE — 36415 COLL VENOUS BLD VENIPUNCTURE: CPT | Performed by: ORTHOPAEDIC SURGERY

## 2025-04-24 PROCEDURE — 250N000011 HC RX IP 250 OP 636: Performed by: ORTHOPAEDIC SURGERY

## 2025-04-24 PROCEDURE — 97116 GAIT TRAINING THERAPY: CPT | Mod: GP

## 2025-04-24 PROCEDURE — 80048 BASIC METABOLIC PNL TOTAL CA: CPT | Performed by: ORTHOPAEDIC SURGERY

## 2025-04-24 PROCEDURE — 250N000013 HC RX MED GY IP 250 OP 250 PS 637: Performed by: ORTHOPAEDIC SURGERY

## 2025-04-24 PROCEDURE — 80048 BASIC METABOLIC PNL TOTAL CA: CPT | Performed by: NURSE PRACTITIONER

## 2025-04-24 PROCEDURE — 97535 SELF CARE MNGMENT TRAINING: CPT | Mod: GO

## 2025-04-24 PROCEDURE — 85018 HEMOGLOBIN: CPT | Performed by: ORTHOPAEDIC SURGERY

## 2025-04-24 PROCEDURE — 97110 THERAPEUTIC EXERCISES: CPT | Mod: GP

## 2025-04-24 PROCEDURE — 97165 OT EVAL LOW COMPLEX 30 MIN: CPT | Mod: GO

## 2025-04-24 PROCEDURE — 97161 PT EVAL LOW COMPLEX 20 MIN: CPT | Mod: GP

## 2025-04-24 RX ORDER — OXYCODONE HYDROCHLORIDE 5 MG/1
2.5-5 TABLET ORAL EVERY 4 HOURS PRN
Qty: 25 TABLET | Refills: 0 | Status: SHIPPED | OUTPATIENT
Start: 2025-04-24

## 2025-04-24 RX ORDER — ASPIRIN 81 MG/1
81 TABLET, COATED ORAL 2 TIMES DAILY
Qty: 84 TABLET | Refills: 0 | Status: SHIPPED | OUTPATIENT
Start: 2025-04-26 | End: 2025-06-07

## 2025-04-24 RX ORDER — AMOXICILLIN 250 MG
1 CAPSULE ORAL 2 TIMES DAILY
Qty: 40 TABLET | Refills: 0 | Status: SHIPPED | OUTPATIENT
Start: 2025-04-24

## 2025-04-24 RX ORDER — ACETAMINOPHEN 325 MG/1
975 TABLET ORAL EVERY 8 HOURS
Status: SHIPPED
Start: 2025-04-24

## 2025-04-24 RX ORDER — CELECOXIB 100 MG/1
100 CAPSULE ORAL 2 TIMES DAILY
Qty: 28 CAPSULE | Refills: 0 | Status: SHIPPED | OUTPATIENT
Start: 2025-04-24 | End: 2025-05-08

## 2025-04-24 RX ADMIN — CELECOXIB 100 MG: 100 CAPSULE ORAL at 10:13

## 2025-04-24 RX ADMIN — LOSARTAN POTASSIUM 50 MG: 50 TABLET, FILM COATED ORAL at 10:20

## 2025-04-24 RX ADMIN — Medication 50 MCG: at 10:19

## 2025-04-24 RX ADMIN — CLOPIDOGREL BISULFATE 75 MG: 75 TABLET ORAL at 11:24

## 2025-04-24 RX ADMIN — THERA TABS 1 TABLET: TAB at 10:19

## 2025-04-24 RX ADMIN — CHLORTHALIDONE 50 MG: 25 TABLET ORAL at 10:24

## 2025-04-24 RX ADMIN — CELECOXIB 100 MG: 100 CAPSULE ORAL at 00:17

## 2025-04-24 RX ADMIN — ACETAMINOPHEN 975 MG: 325 TABLET, FILM COATED ORAL at 10:18

## 2025-04-24 RX ADMIN — Medication 1 CAPSULE: at 10:21

## 2025-04-24 RX ADMIN — ASPIRIN 81 MG: 81 TABLET, COATED ORAL at 10:17

## 2025-04-24 RX ADMIN — POLYETHYLENE GLYCOL AND PROPYLENE GLYCOL 1 DROP: 4; 3 SOLUTION/ DROPS OPHTHALMIC at 10:22

## 2025-04-24 RX ADMIN — POLYETHYLENE GLYCOL 3350 17 G: 17 POWDER, FOR SOLUTION ORAL at 10:13

## 2025-04-24 RX ADMIN — OXYCODONE HYDROCHLORIDE 2.5 MG: 5 TABLET ORAL at 15:05

## 2025-04-24 RX ADMIN — Medication 400 MG: at 10:18

## 2025-04-24 RX ADMIN — SPIRONOLACTONE 25 MG: 25 TABLET, FILM COATED ORAL at 10:19

## 2025-04-24 RX ADMIN — ACETAMINOPHEN 975 MG: 325 TABLET, FILM COATED ORAL at 01:55

## 2025-04-24 RX ADMIN — FLUOXETINE HYDROCHLORIDE 20 MG: 20 CAPSULE ORAL at 10:19

## 2025-04-24 RX ADMIN — IPRATROPIUM BROMIDE 2 SPRAY: 42 SPRAY, METERED NASAL at 10:21

## 2025-04-24 RX ADMIN — CEFAZOLIN 1 G: 1 INJECTION, POWDER, FOR SOLUTION INTRAMUSCULAR; INTRAVENOUS at 06:05

## 2025-04-24 RX ADMIN — POTASSIUM CHLORIDE 20 MEQ: 1500 TABLET, EXTENDED RELEASE ORAL at 10:18

## 2025-04-24 RX ADMIN — SENNOSIDES AND DOCUSATE SODIUM 1 TABLET: 50; 8.6 TABLET ORAL at 10:18

## 2025-04-24 ASSESSMENT — ACTIVITIES OF DAILY LIVING (ADL)
ADLS_ACUITY_SCORE: 24
ADLS_ACUITY_SCORE: 23
ADLS_ACUITY_SCORE: 24
ADLS_ACUITY_SCORE: 24
ADLS_ACUITY_SCORE: 23
ADLS_ACUITY_SCORE: 24
ADLS_ACUITY_SCORE: 24
ADLS_ACUITY_SCORE: 23
ADLS_ACUITY_SCORE: 24
ADLS_ACUITY_SCORE: 23
ADLS_ACUITY_SCORE: 24
ADLS_ACUITY_SCORE: 23
ADLS_ACUITY_SCORE: 23

## 2025-04-24 NOTE — PROGRESS NOTES
Occupational Therapy Discharge Summary    Reason for therapy discharge:    All goals and outcomes met, no further needs identified.    Progress towards therapy goal(s). See goals on Care Plan in [facility] electronic health record for goal details.  Goals met    Therapy recommendation(s):    No further therapy is recommended.

## 2025-04-24 NOTE — PROGRESS NOTES
04/24/25 1050   Appointment Info   Signing Clinician's Name / Credentials (OT) Woodrow Silver OTR/L   Quick Adds   Quick Adds Certification;[facility] Auth & Certification   Living Environment   People in Home alone   Current Living Arrangements apartment   Transportation Anticipated family or friend will provide   Living Environment Comments Pt will have friend staying for 2 nights and then daughter staying. Pt has 4WW, walkin shower chair w/ grab bars and shower chair, RTS, reacher, sock aid, leg    Self-Care   Usual Activity Tolerance good   Current Activity Tolerance moderate   Equipment Currently Used at Home grab bar, toilet;grab bar, tub/shower;raised toilet seat;shower chair;walker, rolling   Fall history within last six months no   Activity/Exercise/Self-Care Comment Pt IND w/ ADLs and IADLs at baseline   General Information   Onset of Illness/Injury or Date of Surgery 04/23/25   Referring Physician Camilo Graff MD   Patient/Family Therapy Goal Statement (OT) wants to go home   Additional Occupational Profile Info/Pertinent History of Current Problem L TKA; PMH COPD, PVD   Existing Precautions/Restrictions other (see comments)  (limit L knee AROM to prevent bleeding)   Left Lower Extremity (Weight-bearing Status) weight-bearing as tolerated (WBAT)   Cognitive Status Examination   Orientation Status orientation to person, place and time   Affect/Mental Status (Cognitive) WNL   Visual Perception   Visual Impairment/Limitations corrective lenses full-time   Sensory   Sensory Quick Adds sensation intact   Pain Assessment   Patient Currently in Pain Yes, see Vital Sign flowsheet   Posture   Posture not impaired   Range of Motion Comprehensive   General Range of Motion lower extremity range of motion deficits identified   Comment, General Range of Motion limit L knee AROM to prevent bleeding   Strength Comprehensive (MMT)   General Manual Muscle Testing (MMT) Assessment no strength deficits identified    Bed Mobility   Bed Mobility supine-sit;sit-supine   Comment (Bed Mobility) SBA   Transfers   Transfers bed-chair transfer;sit-stand transfer;toilet transfer;shower transfer   Transfer Comments SBA-CGA   Activities of Daily Living   BADL Assessment/Intervention lower body dressing;bathing;toileting   Bathing Assessment/Intervention   [place] Level (Bathing) supervision   Lower Body Dressing Assessment/Training   [place] Level (Lower Body Dressing) minimum assist (75% patient effort)   Toileting   [place] Level (Toileting) supervision   Clinical Impression   Criteria for Skilled Therapeutic Interventions Met (OT) Yes, treatment indicated   OT Diagnosis decreased ADLs   Influenced by the following impairments TKA   OT Problem List-Impairments impacting ADL activity tolerance impaired;pain;range of motion (ROM)   Assessment of Occupational Performance 1-3 Performance Deficits   Identified Performance Deficits LE dressing/bathing, bed mobility, all transfers   Planned Therapy Interventions (OT) ADL retraining;bed mobility training;transfer training   Clinical Decision Making Complexity (OT) problem focused assessment/low complexity   Risk & Benefits of therapy have been explained evaluation/treatment results reviewed;patient   OT Total Evaluation Time   OT Eval, Low Complexity Minutes (59300) 10   Therapy Certification   Medical Diagnosis TKA   Start of Care Date 04/24/25   Certification date from 04/24/25   Certification date to 04/24/25   [facility] Authorization Information   Condition type Initial onset (within last 3 months)   Cause of current episode Post Surgical   Nature of treatment Rehabilitative   Functional ability Moderate functional limitations   Documented POC (choose all that apply) Measurable short and long term/discharge treatment goals related to physical and functional deficits.;Frequency of treatment visits and treatment activities to address deficit areas.;Patient agrees to program  participation including home program   Briefly describe symptoms pain, ncreased bleeding w/ range of motion of knee   How did the symptoms start post op   Last 24H: avg pain/symptom intensity  3/10   Past wk: avg pain/symptom intensity 3/10   Frequency of Symptoms Occasionally (26-50% of the time)   Symptom impact on ADLs A little bit   Condition change since eval A little better   General health reported by patient Good   Type of surgery 5-Joint Replacement   OT Goals   Therapy Frequency (OT) One time eval and treatment   OT Predicted Duration/Target Date for Goal Attainment 04/24/25   OT Goals Lower Body Dressing;Bed Mobility;Toilet Transfer/Toileting;Transfers   OT: Lower Body Dressing Modified independent;Goal Met   OT: Bed Mobility Modified independent;supine to/from sitting;Goal Met   OT: Transfer Supervision/stand-by assist;with assistive device;Goal Met  (walkin shower)   OT: Toilet Transfer/Toileting Modified independent;toilet transfer;Goal Met   Interventions   Interventions Quick Adds Self-Care/Home Management   Self-Care/Home Management   Self-Care/Home Mgmt/ADL, Compensatory, Meal Prep Minutes (54738) 12   Symptoms Noted During/After Treatment (Meal Preparation/Planning Training) none   Treatment Detail/Skilled Intervention Pt edu on compensatory strategies for LE dressing - pt completed Mod I. STS w/ SBA and amb. ~15 ft to BR w/ FWW. Pt edu on safety technique for RTS/walkin shower transfer - completed each Mod I. Pt instructed on bed mobility techniques - completed sit<>supine Mod I. Pt edu on sleeping position and car transfers - pt verbalized understanding. Pt ended session in recliner w/ all needs w/in reach and ice pack on knee.   OT Discharge Planning   OT Plan DC OT   OT Discharge Recommendation (DC Rec)   (defer to ortho)   OT Rationale for DC Rec Pt tolerating therapy well. Pt has good home setup and friends/family will be staying w/ pt as needed to assist. Pt has all necessary DME.   OT  Brief overview of current status Mod I w/ ADLs   OT Total Distance Amb During Session (feet) 35   Total Session Time   Timed Code Treatment Minutes 12   Total Session Time (sum of timed and untimed services) 22     [facility]                                                                                   OUTPATIENT OCCUPATIONAL THERAPY    PLAN OF TREATMENT FOR OUTPATIENT REHABILITATION   Patient's Last Name, First Name, Aster Hodges YOB: 1940   Provider's Name   [facility]   Medical Record No.  [id number]     Onset Date: 04/23/25 Start of Care Date: 04/24/25     Medical Diagnosis:  TKA               OT Diagnosis:  decreased ADLs Certification Dates:  From: 04/24/25  To: 04/24/25     See note for plan of treatment, functional goals, and certification details.    I CERTIFY THE NEED FOR THESE SERVICES FURNISHED UNDER        THIS PLAN OF TREATMENT AND WHILE UNDER MY CARE (Physician co-signature of this document indicates review and certification of the therapy plan).

## 2025-04-24 NOTE — PROGRESS NOTES
"[facility] Orthopaedics Progress Note      Post-operative Day: 1 Day Post-Op    Procedure(s):  LEFT TOTAL KNEE ARTHROPLASTY      Subjective: Aster is seen resting in bed, noted to have some increased drainage to left knee overnight, with pressure dressing applied. Denies chest pain or shortness of breath, no dizziness or lightheadedness at rest or with activity.  Tolerating regular diet with no nausea or vomiting.  Voiding and passing gas.    Pain: moderate  Chest pain, SOB:  No      Objective:  Blood pressure (!) 168/73, pulse 83, temperature 97.7  F (36.5  C), temperature source Oral, resp. rate 18, height 1.634 m (5' 4.33\"), weight 78.5 kg (173 lb), SpO2 98%.    Patient Vitals for the past 24 hrs:   BP Temp Temp src Pulse Resp SpO2   04/24/25 0824 (!) 168/73 97.7  F (36.5  C) Oral 83 18 98 %   04/24/25 0200 (!) 168/77 97.5  F (36.4  C) Oral 87 16 98 %   04/24/25 0008 (!) 161/75 97.8  F (36.6  C) Oral 84 16 97 %   04/23/25 2340 (!) 155/72 -- -- 83 -- --   04/23/25 2000 (!) 142/63 97.9  F (36.6  C) Oral 87 16 94 %   04/23/25 1906 (!) 155/70 97.7  F (36.5  C) Oral 90 16 96 %   04/23/25 1800 (!) 158/67 97.6  F (36.4  C) Oral 82 18 98 %   04/23/25 1730 (!) 145/64 97.4  F (36.3  C) Oral 85 14 --   04/23/25 1700 (!) 160/68 -- -- 92 -- 96 %   04/23/25 1630 (!) 158/81 -- -- 83 -- 96 %   04/23/25 1620 -- -- -- 85 -- 96 %   04/23/25 1600 (!) 123/91 -- -- 84 -- 94 %   04/23/25 1540 139/65 98.6  F (37  C) -- 80 29 95 %   04/23/25 1530 (!) 140/65 98.4  F (36.9  C) -- 80 30 96 %   04/23/25 1520 130/61 98.4  F (36.9  C) -- 80 16 96 %   04/23/25 1510 121/58 98.4  F (36.9  C) -- 81 10 98 %   04/23/25 1500 114/56 98.4  F (36.9  C) -- 81 23 100 %   04/23/25 1453 118/58 98.4  F (36.9  C) Core 81 14 100 %   04/23/25 1330 -- -- -- 80 17 99 %   04/23/25 1320 (!) 168/72 -- -- 77 18 99 %   04/23/25 1310 (!) 153/70 -- -- 78 16 98 %   04/23/25 1305 (!) 171/78 -- -- 83 16 100 %   04/23/25 1300 (!) 168/80 -- -- 78 20 97 %   04/23/25 1208 (!) " 176/79 98  F (36.7  C) Temporal 77 16 96 %       Wt Readings from Last 4 Encounters:   04/14/25 78.5 kg (173 lb)   05/12/22 85 kg (187 lb 6.3 oz)       General: Alert and orientated, NAD  Respiratory: Non-labored breathing on RA  LLE motor function, sensation, and circulation intact   Yes, Dorsiflexion/plantarflexion intact and equal bilaterally. Moves all other extremities with ease and purpose   Wound status: incisions are clean dry and intact.  Dressing is saturated with old bloody drainage.  Small amount of bloody drainage at the distal aspect of the incision.  Incision well-approximated with no surrounding erythema  Calf tenderness: Bilateral  No    Pertinent Labs   Lab Results: personally reviewed.     Recent Labs   Lab Test 04/24/25  0601   HGB 10.0*       Plan:   Dressing change today continue to monitor for increased drainage recommend decreased range of motion for the next 1 to 2 then resume normal activities and therapies.  Anticoagulation protocol: Aspirin 81 mg BID  x 42  days and Plavix  Pain medications: Multimodal approach with ice scopainmedication: oxycodone and tylenol   Weight bearing status:  WBAT  Disposition:  Home pending clearance from therapies    Continue cares and rehabilitation     Report completed by:  VAN Priest CNP  Date: 4/24/2025  Time: 10:56 AM

## 2025-04-24 NOTE — PLAN OF CARE
Goal Outcome Evaluation:       Patient vital signs are at baseline: Yes  Patient able to ambulate as they were prior to admission or with assist devices provided by therapies during their stay:  No,  Reason:  bedrest overnight per [facility]CO d/t dressing drainage. Extremity elevated on pillow and monitoring dressing, Dressing more saturated this AM, reinforced with abd pads and ACE wrap. Ice applied  Patient MUST void prior to discharge:  Yes, straight cathed at 2230 for 800ml. Pt was then able to void utilizing purewick   Patient able to tolerate oral intake:  Yes  Pain has adequate pain control using Oral analgesics:  Yes, utilizing scheduled pain meds and PRN oxycodone, ice packs  Does patient have an identified :  Yes  Has goal D/C date and time been discussed with patient:  Yes, pending ambulation, therapies

## 2025-04-24 NOTE — PROGRESS NOTES
Physical Therapy Discharge Summary    Reason for therapy discharge:    All goals and outcomes met, no further needs identified.    Progress towards therapy goal(s). See goals on Care Plan in [facility] electronic health record for goal details.  Goals met    Therapy recommendation(s):    Continue home exercise program.   04/24/25 1127   Appointment Info   Signing Clinician's Name / Credentials (PT) Deejay Mckeon, PT   Quick Adds   Quick Adds Certification   Living Environment   People in Home alone   Current Living Arrangements apartment   Home Accessibility stairs to enter home;stairs within home   Number of Stairs, Main Entrance none   Self-Care   Usual Activity Tolerance good   Current Activity Tolerance moderate   Equipment Currently Used at Home grab bar, toilet;grab bar, tub/shower;raised toilet seat;shower chair;walker, rolling   Fall history within last six months no   Activity/Exercise/Self-Care Comment Independent prior for all I ADL's and ADL's   General Information   Onset of Illness/Injury or Date of Surgery 04/24/25   Referring Physician Dr. Graff   Patient/Family Therapy Goals Statement (PT) to be able to walk without pain   Pertinent History of Current Problem (include personal factors and/or comorbidities that impact the POC) L TKA ,Antiplatelet or antithrombotic long-term use      Arthritis     Benign essential tremor      head shakes when stressed   COPD (chronic obstructive pulmonary disease) (H)     Depression     Gastroesophageal reflux disease     History of melanoma     History of skin cancer     Hyperlipidemia     Hypertension     Interstitial cystitis     Lower extremity edema     Osteopenia     PVD (peripheral vascular disease)     Recurrent UTI     Sleep apnea      CPAP   Uncomplicated asthma      with activity   Existing Precautions/Restrictions no known precautions/restrictions   Weight-Bearing Status - LLE weight-bearing as tolerated   Cognition   Affect/Mental Status (Cognition) WFL    Posture    Posture Not impaired   Range of Motion (ROM)   ROM Comment decreased ROM s/p L TKA   Strength (Manual Muscle Testing)   Strength (Manual Muscle Testing) No deficits observed during functional mobility   Transfers   Transfers sit-stand transfer   Sit-Stand Transfer   Sit-Stand [place] (Transfers) contact guard;verbal cues   Assistive Device (Sit-Stand Transfers) walker, front-wheeled   Gait/Stairs (Locomotion)   [place] Level (Gait) contact guard;verbal cues   Assistive Device (Gait) walker, front-wheeled   Distance in Feet (Gait) 20   Pattern (Gait) swing-through   Deviations/Abnormal Patterns (Gait) gait speed decreased;rodo decreased   Balance   Balance no deficits were identified   Clinical Impression   Criteria for Skilled Therapeutic Intervention Yes, treatment indicated   PT Diagnosis (PT) impaired functional mobility   Influenced by the following impairments pain, decreased ROM, impaired balance, decreased strength   Functional limitations due to impairments gait, stairs, transfers   Clinical Presentation (PT Evaluation Complexity) stable   Clinical Presentation Rationale pt presents as medically diagnosed   Clinical Decision Making (Complexity) low complexity   Planned Therapy Interventions (PT) gait training;home exercise program;patient/family education;stair training;transfer training   Risk & Benefits of therapy have been explained care plan/treatment goals reviewed;patient   PT Total Evaluation Time   PT Eval, Low Complexity Minutes (06149) 10   Therapy Certification   Start of care date 04/24/25   Certification date from 04/24/25   Certification date to 04/24/25   Physical Therapy Goals   PT Frequency One time eval and treatment only   PT Predicted Duration/Target Date for Goal Attainment 04/24/25   PT Goals PT Goal 1;Transfers;Gait;Stairs   PT: Transfers Modified independent;Sit to/from stand;Within precautions;Assistive device;Goal Met   PT: Gait Modified independent;Rolling  walker;Within precautions;150 feet;Goal Met   PT: Stairs Minimal assist;4 stairs;Rail on both sides;Goal Met   PT: Goal 1 Independent with written HEP for LE strengthening and ROM, goal met   Interventions   Interventions Quick Adds Therapeutic Procedure;Therapeutic Activity;Gait Training   Therapeutic Procedure/Exercise   Ther. Procedure: strength, endurance, ROM, flexibillity Minutes (53591) 15   Treatment Detail/Skilled Intervention TKA protocol therex x10 reps, cueing for technique,, did not push hard into flexion, only did what patient could comfortably tolerate.  Able to flex to 90 deg.   Therapeutic Activity   Treatment Detail/Skilled Intervention sit to/from stand, cueing for safe hand placement and LE positioning, Mod I following education   Gait Training   Gait Training Minutes (06339) 8   Symptoms Noted During/After Treatment (Gait Training) fatigue;increased pain   Treatment Detail/Skilled Intervention slow stable gait, vc for non recip steps up/down 4 steps, vc for heel strike   Distance in Feet 240   [place] Level (Gait Training) independent   Physical Assistance Level (Gait Training) verbal cues   Weight Bearing (Gait Training) weight-bearing as tolerated   Assistive Device (Gait Training) rolling walker   Pattern Analysis (Gait Training) swing-through gait   Gait Analysis Deviations decreased rodo;decreased step length   Impairments (Gait Analysis/Training) pain;strength decreased   Stair Railings present at both sides   Physical Assist/Nonphysical Assist (Stairs) 1 person assist;supervision;verbal cues   Level of [place] (Stairs) stand-by assist   PT Discharge Planning   PT Plan dc PT   PT Discharge Recommendation (DC Rec) other (see comments)  (per ortho MD)   PT Rationale for DC Rec Patient moving well overall and has good home setup and support   PT Brief overview of current status Patient independent with gait/transfers   PT Total Distance Amb During Session (feet) 260   PT  Equipment Needed at Discharge cane, straight;walker, rolling   Physical Therapy Time and Intention   Timed Code Treatment Minutes 23   Total Session Time (sum of timed and untimed services) 33   [facility]                                                                                   OUTPATIENT PHYSICAL THERAPY    PLAN OF TREATMENT FOR OUTPATIENT REHABILITATION   Patient's Last Name, First Name, CHICHITylorJUTylor  Aster Pierson YOB: 1940   Provider's Name   [facility]   Medical Record No.  [id number]     Onset Date: 04/24/25 Start of Care Date: 04/24/25     Medical Diagnosis:                  PT Diagnosis:  impaired functional mobility Certification Dates:  From: 04/24/25  To: 04/24/25       See note for plan of treatment, functional goals, and certification details.    I CERTIFY THE NEED FOR THESE SERVICES FURNISHED UNDER        THIS PLAN OF TREATMENT AND WHILE UNDER MY CARE (Physician co-signature of this document indicates review and certification of the therapy plan).

## 2025-04-24 NOTE — PLAN OF CARE
Goal Outcome Evaluation:       Patient vital signs are at baseline: Yes  Patient able to ambulate as they were prior to admission or with assist devices provided by therapies during their stay:  Yes  Patient MUST void prior to discharge:  Yes  Patient able to tolerate oral intake:  Yes  Pain has adequate pain control using Oral analgesics:  Yes  Does patient have an identified :  Yes  Has goal D/C date and time been discussed with patient:  Yes, pending dressing drainage.       Provider notified of increased dressing drainage.New dressing applied. Extremity elevated, Ice applied. Cont to monitor drsg.

## 2025-04-25 NOTE — DISCHARGE SUMMARY
[facility] Discharge Summary                                  [facility]     FRANCIA BRYANT [id number]   Age: 84 year old  PCP: Amanda Zambrano, [phone] 1940     Date of Admission:  4/23/2025  Date of Discharge::  4/24/2025  3:44 PM  Discharge Provider:  VAN Priest CNP    Code status:  Prior    Admission Information:  Admission Diagnosis:  Osteoarthritis, knee [M17.9]    Post-Operative Day: 2 Days Post-Op     Reason for admission:  The patient was admitted for the following:Procedure(s) (LRB):  LEFT TOTAL KNEE ARTHROPLASTY (Left)    Active Problems:    History of total knee arthroplasty, left      Allergies:  Patient has no known allergies.    Following the procedure noted above the patient was transferred to the post-op floor and started on:    Therapy:  physical therapy and occupational therapy  Anticoagulation Plan: Aspirin 81 mg BID  for 42 days  Pain Management: scopainmedication: oxycodone and tylenol  Weight bearing status: Weight bearing as tolerated     The patient was followed by Orthopedics during the inpatient treatment course:  Complications:  None  Additional consultations:  None     Pertinent Labs   Lab Results: personally reviewed.     Recent Labs   Lab Test 04/24/25  0602 04/24/25  0601   HGB  --  10.0*     --           Discharge Information:  Condition at discharge: Stable  Discharge destination:  Discharged to home     Medications at discharge:  Discharge Medication List as of 4/24/2025  3:21 PM        START taking these medications    Details   aspirin (ASA) 81 MG EC tablet Take 1 tablet (81 mg) by mouth 2 times daily., Disp-84 tablet, R-0, E-Prescribe      celecoxib (CELEBREX) 100 MG capsule Take 1 capsule (100 mg) by mouth 2 times daily for 14 days., Disp-28 capsule, R-0, E-Prescribe      oxyCODONE (ROXICODONE) 5 MG tablet Take 0.5-1 tablets (2.5-5 mg) by mouth every 4 hours as needed for moderate to severe pain., Disp-25  tablet, R-0, E-Prescribe      senna-docusate (SENOKOT-S/PERICOLACE) 8.6-50 MG tablet Take 1 tablet by mouth 2 times daily., Disp-40 tablet, R-0, E-Prescribe           CONTINUE these medications which have CHANGED    Details   acetaminophen (TYLENOL) 325 MG tablet Take 3 tablets (975 mg) by mouth every 8 hours., No Print Out           CONTINUE these medications which have NOT CHANGED    Details   albuterol (PROAIR HFA/PROVENTIL HFA/VENTOLIN HFA) 108 (90 Base) MCG/ACT inhaler Inhale 1-2 puffs into the lungs every 4 hours as needed for shortness of breath, wheezing or cough., Historical      amoxicillin (AMOXIL) 500 MG capsule Take 2,000 mg by mouth daily as needed (1 hour prior to dental)., Historical      atorvastatin (LIPITOR) 10 MG tablet Take 10 mg by mouth at bedtime., Historical      chlorthalidone (HYGROTON) 50 MG tablet Take 50 mg by mouth daily., Historical      clopidogrel (PLAVIX) 75 MG tablet Take 75 mg by mouth daily. Last dose 4/15/25 before surgery, Historical      Coenzyme Q10 (COQ10 PO) Take 1 capsule by mouth daily. With turmeric and black pepper, Historical      Cranberry 500 MG CAPS Take 1 capsule by mouth 2 times daily., Historical      FIBER ADULT GUMMIES PO Take 2 Gum by mouth daily., Historical      FLUoxetine 20 MG tablet Take 20 mg by mouth daily., Historical      fluticasone-vilanterol (BREO ELLIPTA) 200-25 MCG/ACT inhaler Inhale 1 puff into the lungs daily., Historical      ipratropium (ATROVENT) 0.06 % nasal spray Spray 2 sprays into both nostrils daily., Historical      lactobacillus rhamnosus, GG, (CULTURELL) capsule Take 1 capsule by mouth daily., Historical      losartan (COZAAR) 50 MG tablet Take 50 mg by mouth 2 times daily., Historical      Magnesium Citrate (MAGNESIUM GUMMIES PO) Take 330 mg by mouth daily. Calm, Historical      montelukast (SINGULAIR) 10 MG tablet Take 10 mg by mouth at bedtime., Historical      multivitamin, therapeutic (THERA-VIT) TABS tablet Take 1 tablet by  mouth daily. Stopped 4/16/25 before surgery, Historical      polyethylene glycol-propylene glycol (SYSTANE ULTRA) 0.4-0.3 % SOLN ophthalmic solution Place 1 drop into both eyes 2 times daily., Historical      potassium chloride ER (K-TAB) 20 MEQ CR tablet Take 20 mEq by mouth daily., Historical      spironolactone (ALDACTONE) 25 MG tablet Take 25 mg by mouth daily., Historical      TART CHERRY PO Take 1 capsule by mouth daily. Blueberry 645mg and tart cherry 180mg, Historical      UNABLE TO FIND Take 645 mg by mouth daily. MEDICATION NAME: Tart Blueberry, Historical      vitamin D3 (CHOLECALCIFEROL) 50 mcg (2000 units) tablet Take 1 tablet by mouth daily., Historical           STOP taking these medications       BIOTIN MAXIMUM PO Comments:   Reason for Stopping:                          Follow-Up Care:  Patient should be seen in the office in 14 days by the Orthopedic Surgeon/Physician Assistant.  Call [phone] for appointment or questions.    It was my pleasure to take care of the above patient.  VAN Priest CNP

## 2025-04-28 NOTE — PROGRESS NOTES
04/24/25 1127   Appointment Info   Signing Clinician's Name / Credentials (PT) Deejay Mckeon, PT   Quick Adds   Quick Adds Certification;[facility] Auth & Certification   Living Environment   People in Home alone   Current Living Arrangements apartment   Home Accessibility stairs to enter home;stairs within home   Number of Stairs, Main Entrance none   Self-Care   Usual Activity Tolerance good   Current Activity Tolerance moderate   Equipment Currently Used at Home grab bar, toilet;grab bar, tub/shower;raised toilet seat;shower chair;walker, rolling   Fall history within last six months no   Activity/Exercise/Self-Care Comment Independent prior for all I ADL's and ADL's   General Information   Onset of Illness/Injury or Date of Surgery 04/24/25   Referring Physician Dr. Graff   Patient/Family Therapy Goals Statement (PT) to be able to walk without pain   Pertinent History of Current Problem (include personal factors and/or comorbidities that impact the POC) L TKA ,Antiplatelet or antithrombotic long-term use      Arthritis     Benign essential tremor      head shakes when stressed   COPD (chronic obstructive pulmonary disease) (H)     Depression     Gastroesophageal reflux disease     History of melanoma     History of skin cancer     Hyperlipidemia     Hypertension     Interstitial cystitis     Lower extremity edema     Osteopenia     PVD (peripheral vascular disease)     Recurrent UTI     Sleep apnea      CPAP   Uncomplicated asthma      with activity   Existing Precautions/Restrictions no known precautions/restrictions   Weight-Bearing Status - LLE weight-bearing as tolerated   Cognition   Affect/Mental Status (Cognition) WFL   Posture    Posture Not impaired   Range of Motion (ROM)   ROM Comment decreased ROM s/p L TKA   Strength (Manual Muscle Testing)   Strength (Manual Muscle Testing) No deficits observed during functional mobility   Transfers   Transfers sit-stand transfer   Sit-Stand Transfer   Sit-Stand  [place] (Transfers) contact guard;verbal cues   Assistive Device (Sit-Stand Transfers) walker, front-wheeled   Gait/Stairs (Locomotion)   [place] Level (Gait) contact guard;verbal cues   Assistive Device (Gait) walker, front-wheeled   Distance in Feet (Gait) 20   Pattern (Gait) swing-through   Deviations/Abnormal Patterns (Gait) gait speed decreased;rodo decreased   Balance   Balance no deficits were identified   Clinical Impression   Criteria for Skilled Therapeutic Intervention Yes, treatment indicated   PT Diagnosis (PT) impaired functional mobility   Influenced by the following impairments pain, decreased ROM, impaired balance, decreased strength   Functional limitations due to impairments gait, stairs, transfers   Clinical Presentation (PT Evaluation Complexity) stable   Clinical Presentation Rationale pt presents as medically diagnosed   Clinical Decision Making (Complexity) low complexity   Planned Therapy Interventions (PT) gait training;home exercise program;patient/family education;stair training;transfer training   Risk & Benefits of therapy have been explained care plan/treatment goals reviewed;patient   PT Total Evaluation Time   PT Eval, Low Complexity Minutes (98605) 10   Therapy Certification   Start of care date 04/24/25   Certification date from 04/24/25   Certification date to 04/24/25   [facility] Authorization Information   Condition type Chronic (continuous duration <3 months)   Cause of current episode Post Surgical   Nature of treatment Rehabilitative   Functional ability Moderate functional limitations   Documented POC (choose all that apply) Measurable short and long term/discharge treatment goals related to physical and functional deficits.;Frequency of treatment visits and treatment activities to address deficit areas.;Patient agrees to program participation including home program   Briefly describe symptoms aching, sharp pain wiht activity   How did the symptoms start due to loss  of cartilage   Last 24H: avg pain/symptom intensity  5/10   Past wk: avg pain/symptom intensity 6/10   Frequency of Symptoms Frequently (51-75% of the time)   Symptom impact on ADLs Moderately   Condition change since eval No change   General health reported by patient Good   Type of surgery 5-Joint Replacement   Physical Therapy Goals   PT Frequency One time eval and treatment only   PT Predicted Duration/Target Date for Goal Attainment 04/24/25   PT Goals PT Goal 1;Transfers;Gait;Stairs   PT: Transfers Modified independent;Sit to/from stand;Within precautions;Assistive device;Goal Met   PT: Gait Modified independent;Rolling walker;Within precautions;150 feet;Goal Met   PT: Stairs Minimal assist;4 stairs;Rail on both sides;Goal Met   PT: Goal 1 Independent with written HEP for LE strengthening and ROM, goal met   Interventions   Interventions Quick Adds Therapeutic Procedure;Therapeutic Activity;Gait Training   Therapeutic Procedure/Exercise   Ther. Procedure: strength, endurance, ROM, flexibillity Minutes (49164) 15   Treatment Detail/Skilled Intervention TKA protocol therex x10 reps, cueing for technique,, did not push hard into flexion, only did what patient could comfortably tolerate.  Able to flex to 90 deg.   Therapeutic Activity   Treatment Detail/Skilled Intervention sit to/from stand, cueing for safe hand placement and LE positioning, Mod I following education   Gait Training   Gait Training Minutes (61021) 8   Symptoms Noted During/After Treatment (Gait Training) fatigue;increased pain   Treatment Detail/Skilled Intervention slow stable gait, vc for non recip steps up/down 4 steps, vc for heel strike   Distance in Feet 240   [place] Level (Gait Training) independent   Physical Assistance Level (Gait Training) verbal cues   Weight Bearing (Gait Training) weight-bearing as tolerated   Assistive Device (Gait Training) rolling walker   Pattern Analysis (Gait Training) swing-through gait   Gait Analysis  Deviations decreased rodo;decreased step length   Impairments (Gait Analysis/Training) pain;strength decreased   Stair Railings present at both sides   Physical Assist/Nonphysical Assist (Stairs) 1 person assist;supervision;verbal cues   Level of [place] (Stairs) stand-by assist   PT Discharge Planning   PT Plan dc PT   PT Discharge Recommendation (DC Rec) other (see comments)  (per ortho MD)   PT Rationale for DC Rec Patient moving well overall and has good home setup and support   PT Brief overview of current status Patient independent with gait/transfers   PT Total Distance Amb During Session (feet) 260   PT Equipment Needed at Discharge cane, straight;walker, rolling   Physical Therapy Time and Intention   Timed Code Treatment Minutes 23   Total Session Time (sum of timed and untimed services) 33   [facility]                                                                                   OUTPATIENT PHYSICAL THERAPY    PLAN OF TREATMENT FOR OUTPATIENT REHABILITATION   Patient's Last Name, First Name, MIRTA PiersonAster DE LA CRUZ YOB: 1940   Provider's Name   [facility]   Medical Record No.  [id number]     Onset Date: 04/24/25 Start of Care Date: 04/24/25     Medical Diagnosis:                  PT Diagnosis:  impaired functional mobility Certification Dates:  From: 04/24/25  To: 04/24/25       See note for plan of treatment, functional goals, and certification details.    I CERTIFY THE NEED FOR THESE SERVICES FURNISHED UNDER        THIS PLAN OF TREATMENT AND WHILE UNDER MY CARE (Physician co-signature of this document indicates review and certification of the therapy plan).

## 2025-07-16 ENCOUNTER — DOCUMENTATION ONLY (OUTPATIENT)
Dept: OTHER | Facility: CLINIC | Age: 85
End: 2025-07-16
Payer: COMMERCIAL

## (undated) DEVICE — SUTURE MONOCRYL+ 2-0 27IN CT2 MCP333H

## (undated) DEVICE — SCREW TEMP PSN ZIM FEMALE 2.5MM 42-5099-025-25

## (undated) DEVICE — GLOVE BIOGEL INDICATOR 7.5 LF 41675

## (undated) DEVICE — SOL NACL 0.9% INJ 1000ML BAG 2B1324X

## (undated) DEVICE — GLOVE BIOGEL PI ULTRATOUCH G SZ 7.5 42175

## (undated) DEVICE — DRILL PIN HEADLESS TROCAR 00-5901-020-00

## (undated) DEVICE — ELECTRODE PATIENT RETURN ADULT L10 FT 2 PLATE CORD 0855C

## (undated) DEVICE — SU STRATAFIX MONOCRYL 3-0 SPIRAL PS-2 30CM SXMP1B106

## (undated) DEVICE — GLOVE BIOGEL PI SZ 8.0 40880

## (undated) DEVICE — SUCTION MANIFOLD NEPTUNE 2 SYS 4 PORT 0702-020-000

## (undated) DEVICE — VIAL DECANTER STERILE WHITE DYNJDEC06

## (undated) DEVICE — GLOVE BIOGEL PI INDICATOR 8.0 LF 41680

## (undated) DEVICE — HOOD SURG T7PLUS PEEL AWAY FACE SHIELD STRL LF 0416-801-100

## (undated) DEVICE — SU STRATAFIX PDS PLUS 1 CT-1 18" SXPP1A404

## (undated) DEVICE — HOOD SURG T7 PLUS STRL LF DISP 0416-801-200

## (undated) DEVICE — BLADE SAW SAGITTAL STRK DUAL CUT 4118-135-090

## (undated) DEVICE — SOL WATER IRRIG 1000ML BOTTLE 2F7114

## (undated) DEVICE — CUSTOM PACK TOTAL KNEE SOP5BTKHEC

## (undated) DEVICE — SCREW HEX TEMP PSN 3.5X48MM PERSONA STRL LF 00-5901-035-48

## (undated) DEVICE — SOL NACL 0.9% IRRIG 1000ML BOTTLE 2F7124

## (undated) DEVICE — CUSTOM PACK TOTAL KNEE ACCESSORY SOP5BTAHEA

## (undated) DEVICE — HOLDER LIMB VELCRO OR 0814-1533

## (undated) DEVICE — DRSG STERI STRIP 1X5" R1548

## (undated) RX ORDER — DEXAMETHASONE SODIUM PHOSPHATE 10 MG/ML
INJECTION, EMULSION INTRAMUSCULAR; INTRAVENOUS
Status: DISPENSED
Start: 2025-04-23

## (undated) RX ORDER — ONDANSETRON 2 MG/ML
INJECTION INTRAMUSCULAR; INTRAVENOUS
Status: DISPENSED
Start: 2025-04-23

## (undated) RX ORDER — FENTANYL CITRATE-0.9 % NACL/PF 10 MCG/ML
PLASTIC BAG, INJECTION (ML) INTRAVENOUS
Status: DISPENSED
Start: 2025-04-23

## (undated) RX ORDER — LIDOCAINE HYDROCHLORIDE 10 MG/ML
INJECTION, SOLUTION EPIDURAL; INFILTRATION; INTRACAUDAL; PERINEURAL
Status: DISPENSED
Start: 2025-04-23